# Patient Record
Sex: FEMALE | Race: WHITE | NOT HISPANIC OR LATINO | Employment: FULL TIME | URBAN - METROPOLITAN AREA
[De-identification: names, ages, dates, MRNs, and addresses within clinical notes are randomized per-mention and may not be internally consistent; named-entity substitution may affect disease eponyms.]

---

## 2023-05-10 ENCOUNTER — APPOINTMENT (OUTPATIENT)
Dept: PRIMARY CARE | Facility: CLINIC | Age: 33
End: 2023-05-10
Payer: COMMERCIAL

## 2023-05-12 LAB — URINE CULTURE: NO GROWTH

## 2023-05-18 ENCOUNTER — APPOINTMENT (OUTPATIENT)
Dept: PRIMARY CARE | Facility: CLINIC | Age: 33
End: 2023-05-18
Payer: COMMERCIAL

## 2023-06-13 ENCOUNTER — OFFICE VISIT (OUTPATIENT)
Dept: PRIMARY CARE | Facility: CLINIC | Age: 33
End: 2023-06-13
Payer: COMMERCIAL

## 2023-06-13 VITALS
HEART RATE: 66 BPM | DIASTOLIC BLOOD PRESSURE: 78 MMHG | SYSTOLIC BLOOD PRESSURE: 128 MMHG | TEMPERATURE: 97.6 F | OXYGEN SATURATION: 99 % | HEIGHT: 63 IN | BODY MASS INDEX: 25.78 KG/M2

## 2023-06-13 DIAGNOSIS — Z00.00 HEALTH MAINTENANCE EXAMINATION: Primary | ICD-10-CM

## 2023-06-13 DIAGNOSIS — Z80.0 FAMILY HISTORY OF COLON CANCER IN FATHER: ICD-10-CM

## 2023-06-13 LAB
ANION GAP IN SER/PLAS: 14 MMOL/L (ref 10–20)
CALCIUM (MG/DL) IN SER/PLAS: 9.4 MG/DL (ref 8.6–10.6)
CARBON DIOXIDE, TOTAL (MMOL/L) IN SER/PLAS: 25 MMOL/L (ref 21–32)
CHLORIDE (MMOL/L) IN SER/PLAS: 103 MMOL/L (ref 98–107)
CHOLESTEROL (MG/DL) IN SER/PLAS: 210 MG/DL (ref 0–199)
CHOLESTEROL IN HDL (MG/DL) IN SER/PLAS: 56.8 MG/DL
CHOLESTEROL/HDL RATIO: 3.7
CREATININE (MG/DL) IN SER/PLAS: 0.91 MG/DL (ref 0.5–1.05)
ERYTHROCYTE DISTRIBUTION WIDTH (RATIO) BY AUTOMATED COUNT: 12.5 % (ref 11.5–14.5)
ERYTHROCYTE MEAN CORPUSCULAR HEMOGLOBIN CONCENTRATION (G/DL) BY AUTOMATED: 32.7 G/DL (ref 32–36)
ERYTHROCYTE MEAN CORPUSCULAR VOLUME (FL) BY AUTOMATED COUNT: 96 FL (ref 80–100)
ERYTHROCYTES (10*6/UL) IN BLOOD BY AUTOMATED COUNT: 4.33 X10E12/L (ref 4–5.2)
GFR FEMALE: 87 ML/MIN/1.73M2
GLUCOSE (MG/DL) IN SER/PLAS: 76 MG/DL (ref 74–99)
HEMATOCRIT (%) IN BLOOD BY AUTOMATED COUNT: 41.6 % (ref 36–46)
HEMOGLOBIN (G/DL) IN BLOOD: 13.6 G/DL (ref 12–16)
LDL: 125 MG/DL (ref 0–99)
LEUKOCYTES (10*3/UL) IN BLOOD BY AUTOMATED COUNT: 8.6 X10E9/L (ref 4.4–11.3)
NRBC (PER 100 WBCS) BY AUTOMATED COUNT: 0 /100 WBC (ref 0–0)
PLATELETS (10*3/UL) IN BLOOD AUTOMATED COUNT: 230 X10E9/L (ref 150–450)
POTASSIUM (MMOL/L) IN SER/PLAS: 4.2 MMOL/L (ref 3.5–5.3)
SODIUM (MMOL/L) IN SER/PLAS: 138 MMOL/L (ref 136–145)
TRIGLYCERIDE (MG/DL) IN SER/PLAS: 141 MG/DL (ref 0–149)
UREA NITROGEN (MG/DL) IN SER/PLAS: 10 MG/DL (ref 6–23)
VLDL: 28 MG/DL (ref 0–40)

## 2023-06-13 PROCEDURE — 85027 COMPLETE CBC AUTOMATED: CPT

## 2023-06-13 PROCEDURE — 99385 PREV VISIT NEW AGE 18-39: CPT | Performed by: FAMILY MEDICINE

## 2023-06-13 PROCEDURE — 80061 LIPID PANEL: CPT

## 2023-06-13 PROCEDURE — 80048 BASIC METABOLIC PNL TOTAL CA: CPT

## 2023-06-13 PROCEDURE — 1036F TOBACCO NON-USER: CPT | Performed by: FAMILY MEDICINE

## 2023-06-13 RX ORDER — CLOBETASOL PROPIONATE 0.5 MG/G
OINTMENT TOPICAL
COMMUNITY
Start: 2022-08-11

## 2023-06-13 RX ORDER — LEVONORGESTREL AND ETHINYL ESTRADIOL 0.15-0.03
1 KIT ORAL DAILY
COMMUNITY

## 2023-06-13 RX ORDER — TESTOSTERONE 10 MG/G
GEL TOPICAL DAILY
COMMUNITY
End: 2023-06-13 | Stop reason: ENTERED-IN-ERROR

## 2023-06-13 ASSESSMENT — PATIENT HEALTH QUESTIONNAIRE - PHQ9
2. FEELING DOWN, DEPRESSED OR HOPELESS: NOT AT ALL
SUM OF ALL RESPONSES TO PHQ9 QUESTIONS 1 AND 2: 0
1. LITTLE INTEREST OR PLEASURE IN DOING THINGS: NOT AT ALL

## 2023-06-13 NOTE — ASSESSMENT & PLAN NOTE
Recommended screening guidelines addressed and orders placed as indicated by age and chronic conditions  Screening labs ordered, will call with results  GYN for pap/pelvic/breast  Continue to work on healthy lifestyle including well balanced diet, regular activity, limit alcohol, no tobacco products and safe sexual practices  Follow up annually

## 2023-06-13 NOTE — ASSESSMENT & PLAN NOTE
Pt unsure of exact age of father's diagnosis, possibly 40  Discussed that recommendation would be 10 years prior to dad's diagnosis  She will let me know and we will refer to GI if needed

## 2023-06-13 NOTE — PROGRESS NOTES
"Reason for Visit: Annual Physical Exam    HPI:  Healthy pt, no concerns today  Works as /leader, very active  Does have fam hx of colon cancer in father and paternal grandfather  Not sure of dad's exact age, possibly 40, will double check with mom    Active Problem List  Patient Active Problem List   Diagnosis    Health maintenance examination    Family history of colon cancer in father       Comprehensive Medical/Surgical/Social/Family History  Past Medical History:   Diagnosis Date    Encounter for immunization safety counseling 09/23/2020    HPV vaccine counseling     No past surgical history on file.  Social History     Tobacco Use    Smoking status: Never    Smokeless tobacco: Never   Substance Use Topics    Alcohol use: Yes     Comment: occ/soc    Drug use: Never     Family History   Problem Relation Name Age of Onset    Colon cancer Father  40    Other (anal cancer) Maternal Grandmother      Skin cancer Maternal Grandfather      Colon cancer Paternal Grandfather  65         Allergies and Medications  Patient has no known allergies.  Current Outpatient Medications on File Prior to Visit   Medication Sig Dispense Refill    clobetasol (Temovate) 0.05 % ointment apply to vulva after shower BID x 6 weeks followed by daily until can wean to 2-3 x/week without a flare.      levonorgestreL-ethinyl estrad (Seasonale) 0.15 mg-30 mcg (91) tablet Take 1 tablet by mouth once daily.      [DISCONTINUED] ESTRADIOL VAGL 2 g once daily. PART OF A COMPOUND DRUG 0.6MG      [DISCONTINUED] testosterone (Androgel) 12.5 mg/ 1.25 gram (1 %) gel in metered-dose pump Place on the skin once daily. 0.2% PART OF A COMPOUND DRUG       No current facility-administered medications on file prior to visit.         ROS otherwise negative aside from what was mentioned above in HPI.    Vitals  /78 (BP Location: Left arm, Patient Position: Sitting)   Pulse 66   Temp 36.4 °C (97.6 °F) (Temporal)   Ht 1.6 m (5' 2.99\")   " SpO2 99%   BMI 25.78 kg/m²   Body mass index is 25.78 kg/m².  Physical Exam  Gen: Alert, NAD  HEENT:  PERRL, EOMI, conjunctiva and sclera normal in appearance. External auditory canals/TMs normal; Oral cavity and posterior pharynx without lesions/exudate  Neck:  Supple with FROM; No masses/nodes palpable; Thyroid nontender and without nodules; No VIJAY  Respiratory:  Lungs CTAB  Cardiovascular:  Heart RRR. No M/R/G. Peripheral pulses equal bilaterally  Abdomen:  Soft, nontender, BS present throughout; No R/G/R; No HSM or masses palpated  Extremities:  FROM all extremities; Muscle strength grossly normal with good tone  Neuro:  CN II-XII intact; Reflexes 2+/2+; Gross motor and sensory intact  Skin:  No suspicious lesions present    Assessment and Plan:  Problem List Items Addressed This Visit       Health maintenance examination - Primary    Current Assessment & Plan     Recommended screening guidelines addressed and orders placed as indicated by age and chronic conditions  Screening labs ordered, will call with results  GYN for pap/pelvic/breast  Continue to work on healthy lifestyle including well balanced diet, regular activity, limit alcohol, no tobacco products and safe sexual practices  Follow up annually           Relevant Orders    CBC    Basic Metabolic Panel    Lipid Panel    Family history of colon cancer in father    Current Assessment & Plan     Pt unsure of exact age of father's diagnosis, possibly 40  Discussed that recommendation would be 10 years prior to dad's diagnosis  She will let me know and we will refer to GI if needed              Ketty Campbell MD

## 2023-12-01 ENCOUNTER — OFFICE VISIT (OUTPATIENT)
Dept: UROLOGY | Facility: CLINIC | Age: 33
End: 2023-12-01
Payer: COMMERCIAL

## 2023-12-01 VITALS
DIASTOLIC BLOOD PRESSURE: 77 MMHG | HEART RATE: 59 BPM | BODY MASS INDEX: 25.51 KG/M2 | TEMPERATURE: 97.5 F | WEIGHT: 144 LBS | SYSTOLIC BLOOD PRESSURE: 121 MMHG

## 2023-12-01 DIAGNOSIS — N90.89 LABIAL ADHESION, ACQUIRED: Primary | ICD-10-CM

## 2023-12-01 PROCEDURE — 1036F TOBACCO NON-USER: CPT | Performed by: OBSTETRICS & GYNECOLOGY

## 2023-12-01 PROCEDURE — 99214 OFFICE O/P EST MOD 30 MIN: CPT | Performed by: OBSTETRICS & GYNECOLOGY

## 2023-12-01 NOTE — PROGRESS NOTES
UROLOGIC FOLLOW-UP VISIT     PROBLEM LIST:  1. Clitoral adhesions     HISTORY OF PRESENT ILLNESS:   Candi Peres is a 32 y.o. female who was last seen June 2023 for her annual visit. She returns today to continue monitoring her clitoral adhesions.     She explains she has been well since her last visit. She is still using her dilator as well as uber lube. She also has been applying clobetasol nightly and testosterone every other day. She noticed prior to being on testosterone she was having pain with insertion but that has resolved with utilizing the cream.    She is still having good clitoral stimulation still. She noticed prior to being on combined estrogen+ testosterone she was having pain with insertion but that has resolved with utilizing the cream.    PAST MEDICAL HISTORY:  Past Medical History:   Diagnosis Date    Encounter for immunization safety counseling 09/23/2020    HPV vaccine counseling       PAST SURGICAL HISTORY:  No past surgical history on file.     ALLERGIES:   No Known Allergies     MEDICATIONS:   [unfilled]     SOCIAL HISTORY:  Patient  reports that she has never smoked. She has never used smokeless tobacco. She reports current alcohol use. She reports that she does not use drugs.   Social History     Socioeconomic History    Marital status: Single     Spouse name: Not on file    Number of children: Not on file    Years of education: Not on file    Highest education level: Not on file   Occupational History    Not on file   Tobacco Use    Smoking status: Never    Smokeless tobacco: Never   Substance and Sexual Activity    Alcohol use: Yes     Comment: occ/soc    Drug use: Never    Sexual activity: Not on file   Other Topics Concern    Not on file   Social History Narrative    Not on file     Social Determinants of Health     Financial Resource Strain: Not on file   Food Insecurity: Not on file   Transportation Needs: Not on file   Physical Activity: Not on file   Stress: Not on file   Social  Connections: Not on file   Intimate Partner Violence: Not on file   Housing Stability: Not on file       FAMILY HISTORY:  Family History   Problem Relation Name Age of Onset    Colon cancer Father  40    Other (anal cancer) Maternal Grandmother      Skin cancer Maternal Grandfather      Colon cancer Paternal Grandfather  65       REVIEW OF SYSTEMS:  Constitutional: Negative for fever and chills. Denies anorexia, weight loss.  Eyes: Negative for visual disturbance.   Respiratory: Negative for shortness of breath.    Cardiovascular: Negative for chest pain.   Gastrointestinal: Negative for nausea and vomiting.   Genitourinary: See interval history above.  Skin: Negative for rash.   Neurological: Negative for dizziness and numbness.   Psychiatric/Behavioral: Negative for confusion and decreased concentration.     PHYSICAL EXAM:  Blood pressure 121/77, pulse 59, temperature 36.4 °C (97.5 °F), weight 65.3 kg (144 lb).  Some hyperpigmented, dark brown area at 6 oclock on her perineum and left labia majora    Lab Results   Component Value Date    BUN 10 06/13/2023    CREATININE 0.91 06/13/2023     06/13/2023    K 4.2 06/13/2023     06/13/2023    CO2 25 06/13/2023    CALCIUM 9.4 06/13/2023      Lab Results   Component Value Date    WBC 8.6 06/13/2023    RBC 4.33 06/13/2023    HGB 13.6 06/13/2023    HCT 41.6 06/13/2023    MCV 96 06/13/2023    MCHC 32.7 06/13/2023    RDW 12.5 06/13/2023     06/13/2023           Assessment:    No diagnosis found.    Candi Peres is a 32 y.o. female with lichen sclerosus and clitoral phimosis.     She is doing well. She is still getting good sensation to her clitoris. I want her to continue with her clobetasol, but I would like her to get to a place where she is not needing to use it nightly. We will keep an eye on the hyperpigmented spots on her labia and perineum.      Plan:   Lichen sclerosus  - Continue clobetasol every other night  - Continue the estrogen+ testosterone  cream     Follow up in 6 months for annual wellness exam.    Scribe Attestation  By signing my name below, I, Alexandra Batista   attest that this documentation has been prepared under the direction and in the presence of Christie Sesay MD MPH.

## 2024-01-18 ENCOUNTER — OFFICE VISIT (OUTPATIENT)
Dept: UROLOGY | Facility: CLINIC | Age: 34
End: 2024-01-18
Payer: COMMERCIAL

## 2024-01-18 VITALS
BODY MASS INDEX: 25.69 KG/M2 | WEIGHT: 145 LBS | TEMPERATURE: 97.8 F | DIASTOLIC BLOOD PRESSURE: 78 MMHG | RESPIRATION RATE: 14 BRPM | HEIGHT: 63 IN | HEART RATE: 57 BPM | SYSTOLIC BLOOD PRESSURE: 129 MMHG

## 2024-01-18 DIAGNOSIS — L81.9 HYPERPIGMENTED SKIN LESION: ICD-10-CM

## 2024-01-18 DIAGNOSIS — N90.89 LABIAL ADHESION, ACQUIRED: ICD-10-CM

## 2024-01-18 PROCEDURE — 88305 TISSUE EXAM BY PATHOLOGIST: CPT | Performed by: DERMATOLOGY

## 2024-01-18 PROCEDURE — 88305 TISSUE EXAM BY PATHOLOGIST: CPT

## 2024-01-18 PROCEDURE — 88342 IMHCHEM/IMCYTCHM 1ST ANTB: CPT

## 2024-01-18 PROCEDURE — 1036F TOBACCO NON-USER: CPT | Performed by: OBSTETRICS & GYNECOLOGY

## 2024-01-18 PROCEDURE — 99024 POSTOP FOLLOW-UP VISIT: CPT | Performed by: OBSTETRICS & GYNECOLOGY

## 2024-01-18 PROCEDURE — 88342 IMHCHEM/IMCYTCHM 1ST ANTB: CPT | Performed by: DERMATOLOGY

## 2024-01-18 RX ORDER — ESTRADIOL 0.1 MG/G
CREAM VAGINAL
Qty: 42.5 G | Refills: 3 | Status: SHIPPED | OUTPATIENT
Start: 2024-01-18

## 2024-01-18 ASSESSMENT — PAIN SCALES - GENERAL: PAINLEVEL: 0-NO PAIN

## 2024-01-18 NOTE — PROGRESS NOTES
UROLOGIC FOLLOW-UP VISIT     PROBLEM LIST:  1. Clitoral adhesions       HISTORY OF PRESENT ILLNESS:   Candi Peres is a 33 y.o. female who was last seen on December 2023 for follow-up of hyperpigmentation around the vulva that has worsened since our last visit.    INTERVAL HISTORY:  She is currently being managed for clitoral adhesions.    PAST MEDICAL HISTORY:  Past Medical History:   Diagnosis Date    Encounter for immunization safety counseling 09/23/2020    HPV vaccine counseling       PAST SURGICAL HISTORY:  No past surgical history on file.     ALLERGIES:   No Known Allergies     MEDICATIONS:   [unfilled]     SOCIAL HISTORY:  Patient  reports that she has never smoked. She has never used smokeless tobacco. She reports current alcohol use. She reports that she does not use drugs.   Social History     Socioeconomic History    Marital status: Single     Spouse name: Not on file    Number of children: Not on file    Years of education: Not on file    Highest education level: Not on file   Occupational History    Not on file   Tobacco Use    Smoking status: Never    Smokeless tobacco: Never   Substance and Sexual Activity    Alcohol use: Yes     Comment: occ/soc    Drug use: Never    Sexual activity: Not on file   Other Topics Concern    Not on file   Social History Narrative    Not on file     Social Determinants of Health     Financial Resource Strain: Not on file   Food Insecurity: Not on file   Transportation Needs: Not on file   Physical Activity: Not on file   Stress: Not on file   Social Connections: Not on file   Intimate Partner Violence: Not on file   Housing Stability: Not on file       FAMILY HISTORY:  Family History   Problem Relation Name Age of Onset    Colon cancer Father  40    Other (anal cancer) Maternal Grandmother      Skin cancer Maternal Grandfather      Colon cancer Paternal Grandfather  65       REVIEW OF SYSTEMS:  Constitutional: Negative for fever and chills. Denies anorexia, weight  "loss.  Eyes: Negative for visual disturbance.   Respiratory: Negative for shortness of breath.    Cardiovascular: Negative for chest pain.   Gastrointestinal: Negative for nausea and vomiting.   Genitourinary: See interval history above.  Skin: Negative for rash.   Neurological: Negative for dizziness and numbness.   Psychiatric/Behavioral: Negative for confusion and decreased concentration.     PHYSICAL EXAM:  Blood pressure 129/78, pulse 57, temperature 36.6 °C (97.8 °F), resp. rate 14, height 1.6 m (5' 3\"), weight 65.8 kg (145 lb).  Constitutional: Patient appears well-developed and well-nourished. No distress.    Head: Normocephalic and atraumatic.    Neck: Normal range of motion.    Cardiovascular: Normal rate.    Pulmonary/Chest: Effort normal. No respiratory distress.   : Presence of hyperpigmentation with irregular edges along the posterior perineum as well as the left labium minus.  Musculoskeletal: Normal range of motion.    Neurological: Alert and oriented to person, place, and time.  Psychiatric: Normal mood and affect. Behavior is normal. Thought content normal.      Vulvar Biopsy:  Consent for the procedure was given and risks benefits and alternatives included bleeding, infection need for future procedure.  All questions addressed.      Examination of the vulva revealed presence of hyperpigmentation with irregular edges along the posterior perineum as well as the left labia majora. Decision for area for biopsy made to include the abnormal area as well as normal appearing skin.  The area was then prepped with Betadine 10 cc of 1% lidocaine was injected after aspirating.         Punch biopsy was then done and sent to pathology.  The area was closed with a single 3-0 Vicryl single suture.      Hemostasis was achieved.  The patient tolerated the procedure well and was given instructions on wound healing.       Pathology results should return in 2-3 weeks at which time we will have a follow-up " appointment.         Assessment:      Candi Peres is a 33 y.o. female with Clitoral adhesions.     Plan:    Performed a Vulvar biopsy. Await pathology results      Scribe Attestation  By signing my name below, ILaura, Scribe   attest that this documentation has been prepared under the direction and in the presence of Christie Sesay MD MPH.

## 2024-01-24 LAB
LAB AP ASR DISCLAIMER: NORMAL
LABORATORY COMMENT REPORT: NORMAL
PATH REPORT.FINAL DX SPEC: NORMAL
PATH REPORT.GROSS SPEC: NORMAL
PATH REPORT.RELEVANT HX SPEC: NORMAL
PATH REPORT.TOTAL CANCER: NORMAL

## 2024-01-25 ENCOUNTER — APPOINTMENT (OUTPATIENT)
Dept: UROLOGY | Facility: CLINIC | Age: 34
End: 2024-01-25
Payer: COMMERCIAL

## 2024-02-09 DIAGNOSIS — L81.9 HYPERPIGMENTED SKIN LESION: ICD-10-CM

## 2024-02-12 ENCOUNTER — TELEPHONE (OUTPATIENT)
Dept: DERMATOLOGY | Facility: CLINIC | Age: 34
End: 2024-02-12
Payer: COMMERCIAL

## 2024-02-12 NOTE — TELEPHONE ENCOUNTER
Pt is bieng referred to Dr Mcwilliams and the soonest appt she was given was 08/28/24 she is hoping to get a sooner appt if someone can reach out to pt and reschedule .Thanks You

## 2024-02-20 ENCOUNTER — OFFICE VISIT (OUTPATIENT)
Dept: DERMATOLOGY | Facility: CLINIC | Age: 34
End: 2024-02-20
Payer: COMMERCIAL

## 2024-02-20 DIAGNOSIS — L81.9 HYPERPIGMENTATION OF SKIN: Primary | ICD-10-CM

## 2024-02-20 PROCEDURE — 1036F TOBACCO NON-USER: CPT | Performed by: DERMATOLOGY

## 2024-02-20 PROCEDURE — 99203 OFFICE O/P NEW LOW 30 MIN: CPT | Performed by: DERMATOLOGY

## 2024-02-20 ASSESSMENT — DERMATOLOGY QUALITY OF LIFE (QOL) ASSESSMENT
RATE HOW BOTHERED YOU ARE BY SYMPTOMS OF YOUR SKIN PROBLEM (EG, ITCHING, STINGING BURNING, HURTING OR SKIN IRRITATION): 3
DATE THE QUALITY-OF-LIFE ASSESSMENT WAS COMPLETED: 66890
RATE HOW EMOTIONALLY BOTHERED YOU ARE BY YOUR SKIN PROBLEM (FOR EXAMPLE, WORRY, EMBARRASSMENT, FRUSTRATION): 0 - NEVER BOTHERED
WHAT SINGLE SKIN CONDITION LISTED BELOW IS THE PATIENT ANSWERING THE QUALITY-OF-LIFE ASSESSMENT QUESTIONS ABOUT: NONE OF THE ABOVE
ARE THERE EXCLUSIONS OR EXCEPTIONS FOR THE QUALITY OF LIFE ASSESSMENT: NO
DID YOU DOCUMENT A PATIENT REASON(S) FOR NOT USING THE QUALITY OF LIFE ASSESSMENT: NO
RATE HOW BOTHERED YOU ARE BY SYMPTOMS OF YOUR SKIN PROBLEM (EG, ITCHING, STINGING BURNING, HURTING OR SKIN IRRITATION): 3
DID THE PATIENT HAVE A SEVERE MENTAL AND/OR PHYSICAL INCAPACITY THAT PREVENTED HIM OR HER FROM COMPLETING THE ASSESSMENT: NO
RATE HOW BOTHERED YOU ARE BY EFFECTS OF YOUR SKIN PROBLEMS ON YOUR ACTIVITIES (EG, GOING OUT, ACCOMPLISHING WHAT YOU WANT, WORK ACTIVITIES OR YOUR RELATIONSHIPS WITH OTHERS): 0 - NEVER BOTHERED
RATE HOW BOTHERED YOU ARE BY EFFECTS OF YOUR SKIN PROBLEMS ON YOUR ACTIVITIES (EG, GOING OUT, ACCOMPLISHING WHAT YOU WANT, WORK ACTIVITIES OR YOUR RELATIONSHIPS WITH OTHERS): 0 - NEVER BOTHERED
RATE HOW EMOTIONALLY BOTHERED YOU ARE BY YOUR SKIN PROBLEM (FOR EXAMPLE, WORRY, EMBARRASSMENT, FRUSTRATION): 0 - NEVER BOTHERED
RATE HOW BOTHERED YOU ARE BY SYMPTOMS OF YOUR SKIN PROBLEM (EG, ITCHING, STINGING BURNING, HURTING OR SKIN IRRITATION): 0 - NEVER BOTHERED
RATE HOW BOTHERED YOU ARE BY EFFECTS OF YOUR SKIN PROBLEMS ON YOUR ACTIVITIES (EG, GOING OUT, ACCOMPLISHING WHAT YOU WANT, WORK ACTIVITIES OR YOUR RELATIONSHIPS WITH OTHERS): 0 - NEVER BOTHERED
WAS THE PATIENT DIAGNOSED WITH A SKIN CONDITION THAT IS INCLUDED IN THE DENOMINATOR DEFINITION (E.G.PSORIASIS, DERMATITIS) BUT IDENTIFIED A SKIN CONDITION THAT IS NOT (E.G. MELANOMA, NEVI) THE MAIN CONDITION ON THEIR ASSESSMENT: NO
WHAT SINGLE SKIN CONDITION LISTED BELOW IS THE PATIENT ANSWERING THE QUALITY-OF-LIFE ASSESSMENT QUESTIONS ABOUT: NONE OF THE ABOVE
RATE HOW EMOTIONALLY BOTHERED YOU ARE BY YOUR SKIN PROBLEM (FOR EXAMPLE, WORRY, EMBARRASSMENT, FRUSTRATION): 0 - NEVER BOTHERED

## 2024-02-20 ASSESSMENT — DERMATOLOGY PATIENT ASSESSMENT
DO YOU USE SUNSCREEN: OCCASIONALLY
ARE YOU TRYING TO GET PREGNANT: NO
DO YOU HAVE ANY NEW OR CHANGING LESIONS: NO
HAVE YOU HAD OR DO YOU HAVE VASCULAR DISEASE: NO
ARE YOU ON BIRTH CONTROL: YES
WHAT TYPE OF BIRTH CONTROL: PILL
DO YOU HAVE IRREGULAR MENSTRUAL CYCLES: NO
HAVE YOU HAD OR DO YOU HAVE A STAPH INFECTION: NO
ARE YOU AN ORGAN TRANSPLANT RECIPIENT: NO
DO YOU USE A TANNING BED: YES, PREVIOUSLY

## 2024-02-20 ASSESSMENT — PATIENT GLOBAL ASSESSMENT (PGA): PATIENT GLOBAL ASSESSMENT: PATIENT GLOBAL ASSESSMENT:  1 - CLEAR

## 2024-02-20 ASSESSMENT — ITCH NUMERIC RATING SCALE: HOW SEVERE IS YOUR ITCHING?: 0

## 2024-02-20 NOTE — Clinical Note
This is a very tough case - the way the pigment is scattered across the vulva would seem to be more consistent with a reactive melanocytosis. I took some good pictures and I'll see her back in May. If the pigment is not dramatically improving I will do sampling biopsies

## 2024-02-20 NOTE — PROGRESS NOTES
Subjective     Candi Peres is a 33 y.o. female who presents for the following: Pigment Change (Vaginal area).   Started August 2022, referred by ob/gyn to Dr. Sesay for clitoral and labial adhesions. Had surgery to release. Biopsy was done to eval for lichen sclerosis and it showed squamous mucosa with fibrosis; nondiagnostic, could be a tag. Started on clobetasol and testosterone topically. Repeat Vulvar biopsy 1/18/24 by Dr. Christie Sesay in urogyne demonstrated pigment incontinence with mild melanocyte hyperplasia and basal layer melanin pigmentation. This could be consistent with lentigo / melanotic macule but it was only partially representative of the lesion. Due to melanocyte hyperplasia, further biopsies or re-excision is recommended     Review of Systems:  No other skin or systemic complaints other than what is documented elsewhere in the note.    The following portions of the chart were reviewed this encounter and updated as appropriate:          Skin Cancer History  No skin cancer on file.      Specialty Problems    None       Objective   Well appearing patient in no apparent distress; mood and affect are within normal limits.    A focused skin examination was performed. All findings within normal limits unless otherwise noted below.    Assessment/Plan   1. Hyperpigmentation of skin (3)  Left Labium Minus, Perineum, Right Labium Minus  Irregular dark brown macules on both left and right labia majora and on posterior fourchette. On most of the pigmented area the dermoscopy pigmented pattern has even whorls. On the left side the pigment pattern is smudged and broken more but it might be where biopsies have been taken                  Hyperpigmentation due to hormonal change vs. Atypical melanocytic proliferation    - recommendation would be for more sampling biopsies either with myself or Dr. Sesay. If it is with Dr. Sesay I could help interpret the pathology results    - she does report that the pigment is  improving. Given this and the benign dermoscopic pattern in most of the areas we agreed to monitor a little longer    Related Procedures  Follow Up In Dermatology - Established Patient        Follow up 3 months

## 2024-05-09 ENCOUNTER — APPOINTMENT (OUTPATIENT)
Dept: DERMATOLOGY | Facility: CLINIC | Age: 34
End: 2024-05-09
Payer: COMMERCIAL

## 2024-05-21 ENCOUNTER — OFFICE VISIT (OUTPATIENT)
Dept: DERMATOLOGY | Facility: CLINIC | Age: 34
End: 2024-05-21
Payer: COMMERCIAL

## 2024-05-21 DIAGNOSIS — L74.519 PRIMARY FOCAL HYPERHIDROSIS: Primary | ICD-10-CM

## 2024-05-21 DIAGNOSIS — L81.9 HYPERPIGMENTATION OF SKIN: ICD-10-CM

## 2024-05-21 PROCEDURE — 99214 OFFICE O/P EST MOD 30 MIN: CPT | Performed by: DERMATOLOGY

## 2024-05-21 PROCEDURE — 1036F TOBACCO NON-USER: CPT | Performed by: DERMATOLOGY

## 2024-05-21 RX ORDER — ALUMINUM CHLORIDE 20 %
1 LOTION (ML) TOPICAL DAILY
Qty: 120 ML | Refills: 11 | Status: SHIPPED | OUTPATIENT
Start: 2024-05-21

## 2024-05-21 ASSESSMENT — DERMATOLOGY QUALITY OF LIFE (QOL) ASSESSMENT
RATE HOW EMOTIONALLY BOTHERED YOU ARE BY YOUR SKIN PROBLEM (FOR EXAMPLE, WORRY, EMBARRASSMENT, FRUSTRATION): 0 - NEVER BOTHERED
RATE HOW BOTHERED YOU ARE BY SYMPTOMS OF YOUR SKIN PROBLEM (EG, ITCHING, STINGING BURNING, HURTING OR SKIN IRRITATION): 0 - NEVER BOTHERED
ARE THERE EXCLUSIONS OR EXCEPTIONS FOR THE QUALITY OF LIFE ASSESSMENT: NO
RATE HOW BOTHERED YOU ARE BY EFFECTS OF YOUR SKIN PROBLEMS ON YOUR ACTIVITIES (EG, GOING OUT, ACCOMPLISHING WHAT YOU WANT, WORK ACTIVITIES OR YOUR RELATIONSHIPS WITH OTHERS): 0 - NEVER BOTHERED
WHAT SINGLE SKIN CONDITION LISTED BELOW IS THE PATIENT ANSWERING THE QUALITY-OF-LIFE ASSESSMENT QUESTIONS ABOUT: NONE OF THE ABOVE
DATE THE QUALITY-OF-LIFE ASSESSMENT WAS COMPLETED: 66981

## 2024-05-21 ASSESSMENT — PATIENT GLOBAL ASSESSMENT (PGA): PATIENT GLOBAL ASSESSMENT: PATIENT GLOBAL ASSESSMENT:  1 - CLEAR

## 2024-05-21 NOTE — PROGRESS NOTES
"Josie Peres is a 33 y.o. female who presents for the following: HYPERIGEMENTATION OF SKIN (Follow up on perineum area).     Review of Systems:  No other skin or systemic complaints other than what is documented elsewhere in the note.    The following portions of the chart were reviewed this encounter and updated as appropriate:   Tobacco  Allergies  Meds  Problems  Med Hx  Surg Hx         Skin Cancer History  No skin cancer on file.      Specialty Problems    None       Objective   Well appearing patient in no apparent distress; mood and affect are within normal limits.    A focused skin examination was performed. All findings within normal limits unless otherwise noted below.    Assessment/Plan   1. Primary focal hyperhidrosis (2)  Left Axilla, Right Axilla  -Normal appearing skin. Excessive sweating noted.        -Nature of condition discussed  - She works in [employer] industry, flares in summer  - prior treatment with MiraDry and Qbrexa pads (not covered, used coupon)      -Treatment options discussed  -Recommend the following. Discussed insurance coverage and she agrees to try    Related Medications  aluminum chloride (Bromi-Lotion) 20 % lotion  Apply 1 Application topically once daily. To dry skin in affected areas. Once excessive sweating has stopped, may decrease to once or twice weekly. Wash treated area in the morning    2. Hyperpigmentation of skin (3)  Left Labium Minus, Posterior Fourchette, Right Labium Minus  Hyperpigmented patches present but smaller and lighter as compared to prior photos. The patch on posterior fourchette is still dark but has a very even benign \"finger print\" pattern on dermoscopy. That one is stable.                       Started August 2022, referred by ob/gyn to Dr. Sesay for clitoral and labial adhesions. Had surgery to release. Biopsy was done to eval for lichen sclerosis and it showed squamous mucosa with fibrosis; nondiagnostic, could be a tag. Started on " clobetasol and testosterone topically. Repeat Vulvar biopsy 1/18/24 by Dr. Christie Sesay in urogyne demonstrated pigment incontinence with mild melanocyte hyperplasia and basal layer melanin pigmentation. This could be consistent with lentigo / melanotic macule but it was only partially representative of the lesion. Due to melanocyte hyperplasia, further biopsies or re-excision is recommended     Melanocyte hyperplasia from testosterone cream is favored, cont to monitor as it improves. She has follow up with Dr. Sesay next month    Related Procedures  Follow Up In Dermatology - Established Patient  Follow Up In Dermatology - Established Patient        Follow up 4 months hyperpigmentation of vulva

## 2024-05-22 ENCOUNTER — APPOINTMENT (OUTPATIENT)
Dept: DERMATOLOGY | Facility: CLINIC | Age: 34
End: 2024-05-22
Payer: COMMERCIAL

## 2024-05-24 ENCOUNTER — PATIENT MESSAGE (OUTPATIENT)
Dept: DERMATOLOGY | Facility: CLINIC | Age: 34
End: 2024-05-24
Payer: COMMERCIAL

## 2024-05-24 DIAGNOSIS — L74.519 PRIMARY FOCAL HYPERHIDROSIS: Primary | ICD-10-CM

## 2024-05-28 ENCOUNTER — TELEPHONE (OUTPATIENT)
Dept: DERMATOLOGY | Facility: CLINIC | Age: 34
End: 2024-05-28
Payer: COMMERCIAL

## 2024-06-03 ENCOUNTER — APPOINTMENT (OUTPATIENT)
Dept: DERMATOLOGY | Facility: HOSPITAL | Age: 34
End: 2024-06-03
Payer: COMMERCIAL

## 2024-07-22 NOTE — PROGRESS NOTES
FOLLOW-UP VISIT     PROBLEM LIST:  1. Labial adhesions  2. Annual exam       HISTORY OF PRESENT ILLNESS:   Candi Peres is a 33 y.o. female who was last seen on 1/18/24 for follow-up of hyperpigmentation around the vulva. She is currently seeing Dr. Mcwilliams for her hyperpigmentation concerns. She is no longer taking testosterone. She is currently taking clobetasol 0.05%. She is sexually active and states she has some pain with insertion. She is currently using a lubricant.     She presents today for her annual well woman exam.     INTERVAL HISTORY:  She is currently being managed for clitoral adhesions.     PAST MEDICAL HISTORY:  Past Medical History:   Diagnosis Date    Encounter for immunization safety counseling 09/23/2020    HPV vaccine counseling       PAST SURGICAL HISTORY:  No past surgical history on file.     ALLERGIES:   No Known Allergies        SOCIAL HISTORY:  Patient  reports that she has never smoked. She has never used smokeless tobacco. She reports current alcohol use. She reports that she does not use drugs.   Social History     Socioeconomic History    Marital status: Single     Spouse name: Not on file    Number of children: Not on file    Years of education: Not on file    Highest education level: Not on file   Occupational History    Not on file   Tobacco Use    Smoking status: Never    Smokeless tobacco: Never   Substance and Sexual Activity    Alcohol use: Yes     Comment: occ/soc    Drug use: Never    Sexual activity: Not on file   Other Topics Concern    Not on file   Social History Narrative    Not on file     Social Determinants of Health     Financial Resource Strain: Not on file   Food Insecurity: Not on file   Transportation Needs: Not on file   Physical Activity: Not on file   Stress: Not on file   Social Connections: Not on file   Intimate Partner Violence: Not on file   Housing Stability: Not on file       FAMILY HISTORY:  Family History   Problem Relation Name Age of Onset     "Colon cancer Father  40    Other (anal cancer) Maternal Grandmother      Skin cancer Maternal Grandfather      Colon cancer Paternal Grandfather  65       REVIEW OF SYSTEMS:  Constitutional: Negative for fever and chills. Denies anorexia, weight loss.  Eyes: Negative for visual disturbance.   Respiratory: Negative for shortness of breath.    Cardiovascular: Negative for chest pain.   Gastrointestinal: Negative for nausea and vomiting.   Genitourinary: See interval history above.  Skin: Negative for rash.   Neurological: Negative for dizziness and numbness.   Psychiatric/Behavioral: Negative for confusion and decreased concentration.     PHYSICAL EXAM:  Blood pressure 119/70, pulse 66, temperature 36.2 °C (97.1 °F), height 1.6 m (5' 3\"), weight 65.8 kg (145 lb).      The clitoral prepuce is adhered but not as much as previously.      Speculum exam done gently and cervix visualized, no friable tissue, cysts/lesions noted     Stable hyperpigmentation. There is mild hyperpigmentation at 6 o'clock.   See image     Assessment:      1. Labial adhesion, acquired            Candi Peres is a 33 y.o. female with labial adhesions s/p LS.  Hyperpigmentation improved/stable.     Plan:   Referral to pelvic floor physical therapy for entry dyspareunia.  Continue vaginal estrogen and clobetasol.   Follow-up with Dr. Mcwilliams for co-management  Send swab to test for yeast.     Christie Sesay MD MPH     Scribe Attestation  By signing my name below, [email or web address], Scribe   attest that this documentation has been prepared under the direction and in the presence of Christie Sesay MD MPH.  "

## 2024-07-25 ENCOUNTER — APPOINTMENT (OUTPATIENT)
Dept: UROLOGY | Facility: CLINIC | Age: 34
End: 2024-07-25
Payer: COMMERCIAL

## 2024-07-25 VITALS
WEIGHT: 145 LBS | DIASTOLIC BLOOD PRESSURE: 70 MMHG | SYSTOLIC BLOOD PRESSURE: 119 MMHG | HEART RATE: 66 BPM | TEMPERATURE: 97.1 F | BODY MASS INDEX: 25.69 KG/M2 | HEIGHT: 63 IN

## 2024-07-25 DIAGNOSIS — N94.10 DYSPAREUNIA, FEMALE: ICD-10-CM

## 2024-07-25 DIAGNOSIS — B37.31 YEAST INFECTION INVOLVING THE VAGINA AND SURROUNDING AREA: ICD-10-CM

## 2024-07-25 DIAGNOSIS — N90.89 LABIAL ADHESION, ACQUIRED: ICD-10-CM

## 2024-07-25 DIAGNOSIS — Z00.00 ENCOUNTER FOR GENERAL ADULT MEDICAL EXAMINATION WITHOUT ABNORMAL FINDINGS: ICD-10-CM

## 2024-07-25 DIAGNOSIS — Z30.9 ENCOUNTER FOR CONTRACEPTIVE MANAGEMENT, UNSPECIFIED TYPE: ICD-10-CM

## 2024-07-25 PROCEDURE — 99395 PREV VISIT EST AGE 18-39: CPT | Performed by: OBSTETRICS & GYNECOLOGY

## 2024-07-25 PROCEDURE — 87102 FUNGUS ISOLATION CULTURE: CPT

## 2024-07-25 PROCEDURE — 1036F TOBACCO NON-USER: CPT | Performed by: OBSTETRICS & GYNECOLOGY

## 2024-07-25 PROCEDURE — 3008F BODY MASS INDEX DOCD: CPT | Performed by: OBSTETRICS & GYNECOLOGY

## 2024-07-25 RX ORDER — LEVONORGESTREL / ETHINYL ESTRADIOL AND ETHINYL ESTRADIOL 150-30(84)
1 KIT ORAL DAILY
Qty: 91 TABLET | Refills: 3 | Status: SHIPPED | OUTPATIENT
Start: 2024-07-25

## 2024-07-25 ASSESSMENT — PAIN SCALES - GENERAL: PAINLEVEL: 0-NO PAIN

## 2024-07-27 LAB — YEAST SPEC QL CULT: ABNORMAL

## 2024-07-30 DIAGNOSIS — B37.31 YEAST INFECTION INVOLVING THE VAGINA AND SURROUNDING AREA: ICD-10-CM

## 2024-07-30 RX ORDER — FLUCONAZOLE 150 MG/1
TABLET ORAL
Qty: 2 TABLET | Refills: 0 | Status: SHIPPED | OUTPATIENT
Start: 2024-07-30

## 2024-08-28 ENCOUNTER — APPOINTMENT (OUTPATIENT)
Dept: DERMATOLOGY | Facility: CLINIC | Age: 34
End: 2024-08-28
Payer: COMMERCIAL

## 2024-09-09 DIAGNOSIS — Q52.5 FUSION OF LABIA: ICD-10-CM

## 2024-09-10 ENCOUNTER — APPOINTMENT (OUTPATIENT)
Dept: DERMATOLOGY | Facility: CLINIC | Age: 34
End: 2024-09-10
Payer: COMMERCIAL

## 2024-09-10 RX ORDER — CLOBETASOL PROPIONATE 0.5 MG/G
OINTMENT TOPICAL
Qty: 60 G | Refills: 3 | Status: SHIPPED | OUTPATIENT
Start: 2024-09-10

## 2024-10-08 ENCOUNTER — APPOINTMENT (OUTPATIENT)
Dept: DERMATOLOGY | Facility: CLINIC | Age: 34
End: 2024-10-08
Payer: COMMERCIAL

## 2024-10-08 DIAGNOSIS — L81.9 HYPERPIGMENTATION OF SKIN: ICD-10-CM

## 2024-10-08 PROCEDURE — 99213 OFFICE O/P EST LOW 20 MIN: CPT | Performed by: DERMATOLOGY

## 2024-10-08 PROCEDURE — 1036F TOBACCO NON-USER: CPT | Performed by: DERMATOLOGY

## 2024-10-08 ASSESSMENT — DERMATOLOGY PATIENT ASSESSMENT
DO YOU USE SUNSCREEN: DAILY
DO YOU HAVE IRREGULAR MENSTRUAL CYCLES: NO
ARE YOU TRYING TO GET PREGNANT: NO
ARE YOU ON BIRTH CONTROL: YES
DO YOU USE A TANNING BED: NO
HAVE YOU HAD OR DO YOU HAVE VASCULAR DISEASE: NO
HAVE YOU HAD OR DO YOU HAVE A STAPH INFECTION: NO
WHAT TYPE OF BIRTH CONTROL: PILL
ARE YOU AN ORGAN TRANSPLANT RECIPIENT: NO
DO YOU HAVE ANY NEW OR CHANGING LESIONS: NO

## 2024-10-08 ASSESSMENT — DERMATOLOGY QUALITY OF LIFE (QOL) ASSESSMENT
RATE HOW BOTHERED YOU ARE BY EFFECTS OF YOUR SKIN PROBLEMS ON YOUR ACTIVITIES (EG, GOING OUT, ACCOMPLISHING WHAT YOU WANT, WORK ACTIVITIES OR YOUR RELATIONSHIPS WITH OTHERS): 0 - NEVER BOTHERED
RATE HOW BOTHERED YOU ARE BY SYMPTOMS OF YOUR SKIN PROBLEM (EG, ITCHING, STINGING BURNING, HURTING OR SKIN IRRITATION): 0 - NEVER BOTHERED
ARE THERE EXCLUSIONS OR EXCEPTIONS FOR THE QUALITY OF LIFE ASSESSMENT: NO
RATE HOW EMOTIONALLY BOTHERED YOU ARE BY YOUR SKIN PROBLEM (FOR EXAMPLE, WORRY, EMBARRASSMENT, FRUSTRATION): 0 - NEVER BOTHERED
WAS THE PATIENT DIAGNOSED WITH A SKIN CONDITION THAT IS INCLUDED IN THE DENOMINATOR DEFINITION (E.G.PSORIASIS, DERMATITIS) BUT IDENTIFIED A SKIN CONDITION THAT IS NOT (E.G. MELANOMA, NEVI) THE MAIN CONDITION ON THEIR ASSESSMENT: NO
DATE THE QUALITY-OF-LIFE ASSESSMENT WAS COMPLETED: 67121
DID THE PATIENT HAVE A SEVERE MENTAL AND/OR PHYSICAL INCAPACITY THAT PREVENTED HIM OR HER FROM COMPLETING THE ASSESSMENT: NO

## 2024-10-08 ASSESSMENT — PATIENT GLOBAL ASSESSMENT (PGA): PATIENT GLOBAL ASSESSMENT: PATIENT GLOBAL ASSESSMENT:  1 - CLEAR

## 2024-10-08 ASSESSMENT — ITCH NUMERIC RATING SCALE: HOW SEVERE IS YOUR ITCHING?: 0

## 2024-10-08 NOTE — PROGRESS NOTES
"Josie Peres is a 33 y.o. female who presents for the following: Pigment Change (Vaginal area). Last derm visit 5/21/24 for hyperpigmentation for vulva thought to be related to topical testosterone. Monitoring with improvement.     She saw Dr. Sesay in July. Since she stopped the compounded drug with testosterone, intercourse has been more painful again. Started pelvic therapy in Sept, she has completed 6 weeks with stretches and dilation. New lubricant with CBD oil     Review of Systems:  No other skin or systemic complaints other than what is documented elsewhere in the note.    The following portions of the chart were reviewed this encounter and updated as appropriate:   Tobacco  Allergies  Meds  Problems  Med Hx  Surg Hx         Skin Cancer History  No skin cancer on file.      Specialty Problems    None       Objective   Well appearing patient in no apparent distress; mood and affect are within normal limits.    A focused skin examination was performed. All findings within normal limits unless otherwise noted below.    Assessment/Plan   1. Hyperpigmentation of skin (3)  Left Labium Minus, Posterior Fourchette, Right Labium Minus  Hyperpigmented patches present, stable as compared to photos from 5/21/24. The patch on posterior fourchette is still dark but has a very even benign \"finger print\" pattern on dermoscopy. They are all stable. Dr. 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 Sesay for clitoral and labial adhesions. Had surgery to release. Biopsy was done to eval for lichen sclerosis and it showed squamous mucosa with fibrosis; nondiagnostic, could be a tag. Started on clobetasol and testosterone topically. Repeat Vulvar biopsy 1/18/24 by Dr." Christie Sesay in urogyne demonstrated pigment incontinence with mild melanocyte hyperplasia and basal layer melanin pigmentation. This could be consistent with lentigo / melanotic macule but it was only partially representative of the lesion. Due to melanocyte hyperplasia, further biopsies or re-excision is recommended. Instead we have been monitoring closely and carefully with photography    Continue treatment with pelvic therapy    Avoid any further testosterone based therapy at this time. She did think this helped her symptoms but we discussed that any worsening in pigment would require repeat biopsies and potentially an excision    Related Procedures  Follow Up In Dermatology - Established Patient  Follow Up In Dermatology - Established Patient        Follow up 4-6 months for pigmentation

## 2024-12-18 NOTE — PROGRESS NOTES
FOLLOW-UP VISIT       HISTORY OF PRESENT ILLNESS:   Candi Peres is a 34 y.o. female who presents to me today for follow-up of hyperpigmentation of vulva.     Patient last saw Dr. Mcwilliams on 10/8/24 where her hyperpigmented patches remained stable from her last visit. Patient started PFPT in September with stretches and dilation which was going well for her. The patient discontinued use of vaginal estrogen in October due to not seeing benefit. Patient endorses dyspareunia without pain at insertion since the middle of November after menses. She then saw PFPT who told her maybe this was due to discontinuing vaginal estrogen. Patient is currently on Seasonique and has her menses every 3 months. Patient has not tried Intrarosa.     Dry needling with PT has helped a lot but she feels like she has backtracked with the week of menses that ended up causing so much discomfort/pain.    PAST MEDICAL HISTORY:  Past Medical History:   Diagnosis Date    Encounter for immunization safety counseling 09/23/2020    HPV vaccine counseling       PAST SURGICAL HISTORY:  No past surgical history on file.     ALLERGIES:   No Known Allergies     MEDICATIONS:   Medication Documentation Review Audit       Reviewed by Sofia Dewitt MA (Medical Assistant) on 12/19/24 at 1435      Medication Order Taking? Sig Documenting Provider Last Dose Status   clobetasol (Temovate) 0.05 % ointment [id number] Yes APPLY TO VULVA AFTER SHOWER TWICE A DAY X 6 WEEKS FOLLOWED BY DAILY UNTIL CAN WEAN TO 2-3 X/WEEK WITHOUT A FLARE. Christie Sesay MD [facility]  Active   estradiol (Estrace) 0.01 % (0.1 mg/gram) vaginal cream [id number] Yes Insert a pea-sized amount into vagina three times per week at bedtime Christie Sesay MD [facility] Taking Active   fluconazole (Diflucan) 150 mg tablet [id number] Yes Take one tablet every 72 hours Christie Sesay MD [facility]  Active   glycopyrronium tosylate 2.4 % towelette [id number] Yes Apply 1 Application topically once daily. Kerrie Mcwilliams MD  Taking Active   L norgest/e.estradioL-e.estrad (Seasonique) 0.15 mg-30 mcg (84)/10 mcg (7) tablets,dose pack,3 month tablet [id number] Yes TAKE 1 TABLET BY MOUTH EVERY DAY Christie Sesay MD [facility]  Active   levonorgestreL-ethinyl estrad (Seasonale) 0.15 mg-30 mcg (91) tablet [id number] Yes Take 1 tablet by mouth once daily. Historical Provider, MD Taking Active                     SOCIAL HISTORY:  Patient  reports that she has never smoked. She has never used smokeless tobacco. She reports current alcohol use. She reports that she does not use drugs.   Social History     Socioeconomic History    Marital status: Single     Spouse name: Not on file    Number of children: Not on file    Years of education: Not on file    Highest education level: Not on file   Occupational History    Not on file   Tobacco Use    Smoking status: Never    Smokeless tobacco: Never   Substance and Sexual Activity    Alcohol use: Yes     Comment: occ/soc    Drug use: Never    Sexual activity: Not on file   Other Topics Concern    Not on file   Social History Narrative    Not on file     Social Drivers of Health     Financial Resource Strain: Not on file   Food Insecurity: Not on file   Transportation Needs: Not on file   Physical Activity: Not on file   Stress: Not on file   Social Connections: Not on file   Intimate Partner Violence: Not on file   Housing Stability: Not on file       FAMILY HISTORY:  Family History   Problem Relation Name Age of Onset    Colon cancer Father  40    Other (anal cancer) Maternal Grandmother      Skin cancer Maternal Grandfather      Colon cancer Paternal Grandfather  65       REVIEW OF SYSTEMS:  Constitutional: Negative for fever and chills. Denies anorexia, weight loss.  Eyes: Negative for visual disturbance.   Respiratory: Negative for shortness of breath.    Cardiovascular: Negative for chest pain.   Gastrointestinal: Negative for nausea and vomiting.   Genitourinary: See interval history above.  Skin: Negative for  "rash.   Neurological: Negative for dizziness and numbness.   Psychiatric/Behavioral: Negative for confusion and decreased concentration.     PHYSICAL EXAM:  Blood pressure 129/80, pulse 76, temperature 36.2 °C (97.2 °F), height 1.6 m (5' 3\"), weight 66.2 kg (146 lb).  Constitutional: Patient appears well-developed and well-nourished. No distress.    Head: Normocephalic and atraumatic.    Neck: Normal range of motion.    Cardiovascular: Normal rate.    Pulmonary/Chest: Effort normal. No respiratory distress.   Musculoskeletal: Normal range of motion.    Neurological: Alert and oriented to person, place, and time.  Psychiatric: Normal mood and affect. Behavior is normal. Thought content normal.       Assessment:      1. Dyspareunia, female            Candi Peres is a 34 y.o. female with dyspareunia and painful menses.      Plan:   Prescription for Intrarosa suppositories sent to the patient's pharmacy.    Prescription for vaginal valium/baclofen suppositories sent to the compounding pharmacy to use for flare. If she needs it chronically, we will sign CSA.  Provided samples of Intrarosa.   Patient will start skipping the sugar pills with her Seasonique and only have menses every 6 months. Will take Intrarosa as needed during this time.   Follow-up as scheduled on 7/31/25.       Christie Sesay MD MPH       Scribe Attestation  By signing my name below, IMelodie, Scribe   attest that this documentation has been prepared under the direction and in the presence of Christie Sesay MD MPH.   "

## 2024-12-19 ENCOUNTER — APPOINTMENT (OUTPATIENT)
Dept: UROLOGY | Facility: CLINIC | Age: 34
End: 2024-12-19
Payer: COMMERCIAL

## 2024-12-19 VITALS
SYSTOLIC BLOOD PRESSURE: 129 MMHG | HEIGHT: 63 IN | BODY MASS INDEX: 25.87 KG/M2 | HEART RATE: 76 BPM | WEIGHT: 146 LBS | TEMPERATURE: 97.2 F | DIASTOLIC BLOOD PRESSURE: 80 MMHG

## 2024-12-19 DIAGNOSIS — N94.10 DYSPAREUNIA, FEMALE: ICD-10-CM

## 2024-12-19 PROCEDURE — 1036F TOBACCO NON-USER: CPT | Performed by: OBSTETRICS & GYNECOLOGY

## 2024-12-19 PROCEDURE — 3008F BODY MASS INDEX DOCD: CPT | Performed by: OBSTETRICS & GYNECOLOGY

## 2024-12-19 PROCEDURE — 99213 OFFICE O/P EST LOW 20 MIN: CPT | Performed by: OBSTETRICS & GYNECOLOGY

## 2024-12-19 RX ORDER — PRASTERONE 6.5 MG/1
6.5 INSERT VAGINAL NIGHTLY
Qty: 30 EACH | Refills: 5 | Status: SHIPPED | OUTPATIENT
Start: 2024-12-19

## 2024-12-19 ASSESSMENT — PAIN SCALES - GENERAL: PAINLEVEL_OUTOF10: 0-NO PAIN

## 2025-01-06 ENCOUNTER — LAB (OUTPATIENT)
Dept: LAB | Facility: LAB | Age: 35
End: 2025-01-06
Payer: COMMERCIAL

## 2025-01-06 DIAGNOSIS — R39.9 URINARY SYMPTOM OR SIGN: ICD-10-CM

## 2025-01-06 DIAGNOSIS — R39.9 URINARY SYMPTOM OR SIGN: Primary | ICD-10-CM

## 2025-01-06 PROCEDURE — 87186 SC STD MICRODIL/AGAR DIL: CPT

## 2025-01-06 PROCEDURE — 87086 URINE CULTURE/COLONY COUNT: CPT

## 2025-01-06 RX ORDER — NITROFURANTOIN 25; 75 MG/1; MG/1
100 CAPSULE ORAL 2 TIMES DAILY
Qty: 14 CAPSULE | Refills: 0 | Status: SHIPPED | OUTPATIENT
Start: 2025-01-06 | End: 2025-01-13

## 2025-01-06 RX ORDER — NITROFURANTOIN 25; 75 MG/1; MG/1
100 CAPSULE ORAL 2 TIMES DAILY
Qty: 14 CAPSULE | Refills: 0 | Status: CANCELLED | OUTPATIENT
Start: 2025-01-06 | End: 2025-01-13

## 2025-01-08 LAB — BACTERIA UR CULT: ABNORMAL

## 2025-02-10 ENCOUNTER — OFFICE VISIT (OUTPATIENT)
Dept: DERMATOLOGY | Facility: HOSPITAL | Age: 35
End: 2025-02-10
Payer: COMMERCIAL

## 2025-02-10 DIAGNOSIS — L81.9 HYPERPIGMENTATION OF SKIN: ICD-10-CM

## 2025-02-10 PROCEDURE — 99212 OFFICE O/P EST SF 10 MIN: CPT | Performed by: DERMATOLOGY

## 2025-02-10 PROCEDURE — 1036F TOBACCO NON-USER: CPT | Performed by: DERMATOLOGY

## 2025-02-10 ASSESSMENT — ITCH NUMERIC RATING SCALE: HOW SEVERE IS YOUR ITCHING?: 0

## 2025-02-10 ASSESSMENT — DERMATOLOGY PATIENT ASSESSMENT
DO YOU HAVE IRREGULAR MENSTRUAL CYCLES: NO
ARE YOU ON BIRTH CONTROL: YES
ARE YOU TRYING TO GET PREGNANT: NO
DO YOU HAVE ANY NEW OR CHANGING LESIONS: NO
DO YOU USE SUNSCREEN: OCCASIONALLY
DO YOU USE A TANNING BED: NO
HAVE YOU HAD OR DO YOU HAVE A STAPH INFECTION: NO
HAVE YOU HAD OR DO YOU HAVE VASCULAR DISEASE: NO
ARE YOU AN ORGAN TRANSPLANT RECIPIENT: NO

## 2025-02-10 ASSESSMENT — PATIENT GLOBAL ASSESSMENT (PGA): PATIENT GLOBAL ASSESSMENT: PATIENT GLOBAL ASSESSMENT:  1 - CLEAR

## 2025-02-10 ASSESSMENT — DERMATOLOGY QUALITY OF LIFE (QOL) ASSESSMENT
DATE THE QUALITY-OF-LIFE ASSESSMENT WAS COMPLETED: 67246
RATE HOW BOTHERED YOU ARE BY SYMPTOMS OF YOUR SKIN PROBLEM (EG, ITCHING, STINGING BURNING, HURTING OR SKIN IRRITATION): 0 - NEVER BOTHERED
DID THE PATIENT HAVE A SEVERE MENTAL AND/OR PHYSICAL INCAPACITY THAT PREVENTED HIM OR HER FROM COMPLETING THE ASSESSMENT: NO
WAS THE PATIENT DIAGNOSED WITH A SKIN CONDITION THAT IS INCLUDED IN THE DENOMINATOR DEFINITION (E.G.PSORIASIS, DERMATITIS) BUT IDENTIFIED A SKIN CONDITION THAT IS NOT (E.G. MELANOMA, NEVI) THE MAIN CONDITION ON THEIR ASSESSMENT: NO
RATE HOW EMOTIONALLY BOTHERED YOU ARE BY YOUR SKIN PROBLEM (FOR EXAMPLE, WORRY, EMBARRASSMENT, FRUSTRATION): 0 - NEVER BOTHERED
DID YOU DOCUMENT A PATIENT REASON(S) FOR NOT USING THE QUALITY OF LIFE ASSESSMENT: NO
ARE THERE EXCLUSIONS OR EXCEPTIONS FOR THE QUALITY OF LIFE ASSESSMENT: NO
RATE HOW BOTHERED YOU ARE BY EFFECTS OF YOUR SKIN PROBLEMS ON YOUR ACTIVITIES (EG, GOING OUT, ACCOMPLISHING WHAT YOU WANT, WORK ACTIVITIES OR YOUR RELATIONSHIPS WITH OTHERS): 0 - NEVER BOTHERED

## 2025-02-10 NOTE — PROGRESS NOTES
Subjective     Candi Peres is a 34 y.o. female who presents for the following: Pigment Change (Vaginal area). Last derm visit 10/8/24 for hyperpigmentation of vulva    Since last visit her menstrual cycle restarted and has undone her progress from pelvic floor therapy. She was started on baclofen/diazepam suppository and Intrarosa suppositories for symptomatic relief. She is now skipping the sugar pills on her Seasonique to avoid menstrual cycles    Review of Systems:  No other skin or systemic complaints other than what is documented elsewhere in the note.    The following portions of the chart were reviewed this encounter and updated as appropriate:   Tobacco  Allergies  Meds  Problems  Med Hx  Surg Hx         Skin Cancer History  No skin cancer on file.      Specialty Problems    None       Objective   Well appearing patient in no apparent distress; mood and affect are within normal limits.    A focused skin examination was performed. All findings within normal limits unless otherwise noted below.    Assessment/Plan   1. Hyperpigmentation of skin (3)  Left Labium Minus, Posterior Fourchette, Right Labium Minus  Hyperpigmented patches present, fading as compared to photos from 5/21/24. The pigment pattern has faded and is no longer crisp. It has faded uniformly. Of note, Dr. 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 Sesay for clitoral and labial adhesions. Had surgery to release. Biopsy was done to eval for lichen sclerosis and it showed squamous mucosa with fibrosis; nondiagnostic, could be a tag. Started on clobetasol and testosterone topically. Repeat Vulvar biopsy 1/18/24 by Dr. Christie Sesay in urogyne demonstrated pigment incontinence with mild  melanocyte hyperplasia and basal layer melanin pigmentation. This could be consistent with lentigo / melanotic macule but it was only partially representative of the lesion. Due to melanocyte hyperplasia, further biopsies or re-excision was recommended. Instead we have been monitoring closely and carefully with photography    Today:  - fading of hyperpigmentation after 1 year  - continue treatments with baclofen/diapzepam, intrarosa, and Seasonique with Dr. Sesay. She has also restarted estradiol cream withOUT re-hyperpigmentation  - would avoid testosterone cream since that was likely the cause    -can space out visits further. She sees Dr. Sesay in July (6 months) and can see me 2 months after that     Related Procedures  Follow Up In Dermatology - Established Patient  Follow Up In Dermatology - Established Patient        Follow up 8/2025 for hyperpigmentation of vulva

## 2025-02-25 ENCOUNTER — APPOINTMENT (OUTPATIENT)
Dept: ORTHOPEDIC SURGERY | Facility: CLINIC | Age: 35
End: 2025-02-25
Payer: COMMERCIAL

## 2025-02-25 ENCOUNTER — HOSPITAL ENCOUNTER (OUTPATIENT)
Dept: RADIOLOGY | Facility: CLINIC | Age: 35
Discharge: HOME | End: 2025-02-25
Payer: COMMERCIAL

## 2025-02-25 DIAGNOSIS — M54.16 LUMBAR RADICULITIS: ICD-10-CM

## 2025-02-25 DIAGNOSIS — M62.830 BACK MUSCLE SPASM: ICD-10-CM

## 2025-02-25 DIAGNOSIS — M47.816 LUMBAR SPONDYLOSIS: Primary | ICD-10-CM

## 2025-02-25 DIAGNOSIS — M47.816 LUMBAR SPONDYLOSIS: ICD-10-CM

## 2025-02-25 PROCEDURE — 1036F TOBACCO NON-USER: CPT | Performed by: PHYSICAL MEDICINE & REHABILITATION

## 2025-02-25 PROCEDURE — 72120 X-RAY BEND ONLY L-S SPINE: CPT

## 2025-02-25 PROCEDURE — 99204 OFFICE O/P NEW MOD 45 MIN: CPT | Performed by: PHYSICAL MEDICINE & REHABILITATION

## 2025-02-25 PROCEDURE — 72110 X-RAY EXAM L-2 SPINE 4/>VWS: CPT | Performed by: RADIOLOGY

## 2025-02-25 RX ORDER — METHOCARBAMOL 500 MG/1
TABLET, FILM COATED ORAL
Qty: 60 TABLET | Refills: 1 | Status: SHIPPED | OUTPATIENT
Start: 2025-02-25

## 2025-02-25 SDOH — SOCIAL STABILITY: SOCIAL NETWORK: SOCIAL ACTIVITY:: 6

## 2025-02-25 NOTE — PROGRESS NOTES
New Consult/New Patient Note    2/25/2025   No ref. provider found    Assessment: Very pleasant 34-year-old female with subacute left lower back and gluteal pain.  Intermittent rating symptoms to the left lateral thigh.  Pain can be severe and impacts her daily activity.  -Suspect left lumbar discogenic pain with intermittent radiculitis  -Component of myofascial pain    PLAN:  1)  Imaging/Diagnostic Studies: We will obtain updated lumbar x-ray to further assess.  MRI also ordered as she has had persistent pain symptoms despite well over 6 weeks of conservative treatment including physical therapy as well as.  Prior notes were also reviewed from her physical therapist office regarding dry needling and needles being retained  2)  Therapy/Rehabilitation: Has completed over 6 weeks including pelvic floor therapy as well as therapy dedicated to her lumbar spine.  This was done at a private office.  She has been working with them at least since December 2024 and continues  3)  Pharmacological Management: New Rx provided for Robaxin as needed-advised potential sedate of side effects.  No significant relief with NSAIDs  4)  Spine/Surgical Interventions: None at this time  5)  Alternative Treatments: May consider alternative treatment options in the future including manipulation (chiropractor versus osteopathic) and/or acupuncture if patient does not obtain optimal relief with initial treatment plan.  6)  Consultations:  None at this time  7)  Follow -up: 4-6 weeks or PRN if symptoms worsen/do not improve.   8)  Future treatment considerations: Pending updated MRI.  May benefit from lumbar CRISTIAN.    Patient advised of the difference between hurt and harm and advised to continue with all normal activities and exercises. Patient verbalized understanding of the above plan and was happy with the care provided.      The above clinical summary has been dictated with voice recognition software. It has not been proofread for  grammatical errors, typographical mistakes, or other semantic inconsistencies.    Thank you for visiting our office today. It was our pleasure to take part in your healthcare.     Do not hesitate to call with any questions regarding your plan of care after leaving at [phone]    To clinicians, thank you very much for this kind referral. It is a privilege to partner with you in the care of your patients. My office would be delighted to assist you with any further consultations or with questions regarding the plan of care outlined. Do not hesitate to call the office or contact me directly.     Sincerely,    MAKENNA Jones MD  , Physical Medicine and Rehabilitation, Orthopedic Spine  [facility] [employer]  [facility]         Candi Peres   is a 34 y.o. female who presents with left lower back pain  Location:  left lower back, at the waist or lower.  Ischial area   Radiation:  left lateral thigh.  No worsening numbness or weakness  Quality: dull ache    current 6/10,  at its worst  6/10  Exacerbated by dry needling. Of note, had needles left in place after her sessions of Dec. 3rd.  standing  Relieved by  yoga, keeping good posture   Onset, traumatic event:  feels pain started after dry needling   Has tried:  hot yoga    Valsalva sign is neg  Grocery cart sign is neg  Smoker:  no  Does not wake them at night  Litigation: no    Patient denies bowel/bladder incontinence, denies fever, denies unintentional weight loss, denies clumsiness of hands, feet, or dropping things.  Denies any constitutional or myelopathic symptomatology.      PREVIOUS TREATMENTS  IN THE LAST SIX MONTHS     Active conservative therapy  in the last six months (see below)              1. Physical therapy:  yes - pelvic floor and for lower back                                                                       2. Home exercise program after PT:  yes                                                    3. A physician supervised home exercise program (HEP):   yes              4. Chiropractic Care:  no                                                                  Passive conservative therapy  in the last six months (see below)              1. NSAIDS:  yes                                                                                                         2. Prescription pain medication:                                                              3. Acupuncture:                                                                                             4. Tens unit:      Assistive Devices:     Work status:  well director       ROS: Other than listed in HPI, PMHX below, and intake paperwork including a 30 point patient-recorded review of symptoms which was personally reviewed and inclusive of no history of unintentional weight loss, change in appetite, significant malaise, fevers, chills, or change in bowel/bladder, shortness of breath, or chest pain.    I have confirmed and edited as necessary Past Medical, Past Surgical, Family, Social History and ROS as obtained by others. These were also obtained on new patient forms.      PHYSICAL EXAM:   GENERAL APPEARANCE:  Well nourished, well developed, and no apparent distress.  NEURO PSYCH: Patient oriented to person, place, Mood pleasant. Benign affect.  MUSCULOSKELETAL and NEUROLOGICAL       VISUAL INSPECTION           LUMBAR: WNL  SPINE ROM:   LUMBAR ROM: Full with mild pain endrange flexion and extension      PALPATION:           SPINOUS PROCESS: Nontender lumbar           PARASPINALS: Tenderness to the left lumbosacral area.  No significant tenderness over the greater trochanter  FACET LOADING: Mildly positive left lower lumbar  MUSCLE BULK: Normal and symmetrical in the upper & lower extremities.  MUSCLE TONE: Normal  MOTOR: 5/5 in all muscle groups tested in bilateral lower extremities   SENSORY: Normal sensory exam to light  touch in the lower extremities  GAIT: Normal.  Able to go up and heels and toes with no sig. weakness.  No sig. balance deficit appreciated  STRAIGHT LEG TEST: Mildly positive on the left the pain does not go past the knee  PERIPHERAL JOINT ROM:   HIP ROM: Full bilaterally  HARISH/Thigh Thrust/Compression/Romario Finger:  Negative bilaterally   Hip Exam including thigh thrust and LOG ROLL: Negative bilaterally    DATA REVIEW:   The below imaging studies were personally reviewed and discussed with the patient.    Medical Decision Making:  The above note constitutes a Moderate to High level of medical decision making based on past data and imaging review, new and chronic symptoms with exacerbation, change in weakness or sensation, new imaging and diagnostic studies ordered, discussion of potential interventional or surgical treatment options, acute or chronic pain that may pose a threat to bodily function.    Past Medical History:   Diagnosis Date    Encounter for immunization safety counseling 09/23/2020    HPV vaccine counseling       Medication Documentation Review Audit       Reviewed by Kerrie Mcwilliams MD (Physician) on 02/10/25 at 0905      Medication Order Taking? Sig Documenting Provider Last Dose Status   clobetasol (Temovate) 0.05 % ointment [id number]  APPLY TO VULVA AFTER SHOWER TWICE A DAY X 6 WEEKS FOLLOWED BY DAILY UNTIL CAN WEAN TO 2-3 X/WEEK WITHOUT A FLARE. Christie Sesay MD [facility]  Active   estradiol (Estrace) 0.01 % (0.1 mg/gram) vaginal cream [id number] No Insert a pea-sized amount into vagina three times per week at bedtime Christie Sesay MD [facility] Taking Active   fluconazole (Diflucan) 150 mg tablet [id number]  Take one tablet every 72 hours Christie Sesay MD [facility]  Active   glycopyrronium tosylate 2.4 % towelette [id number] No Apply 1 Application topically once daily. Kerrie Mcwilliams MD Taking Active   L norgest/e.estradioL-e.estrad (Seasonique) 0.15 mg-30 mcg (84)/10 mcg (7) tablets,dose pack,3 month tablet  [id number]  TAKE 1 TABLET BY MOUTH EVERY DAY Christie Sesay MD [facility]  Active   levonorgestreL-ethinyl estrad (Seasonale) 0.15 mg-30 mcg (91) tablet [id number] No Take 1 tablet by mouth once daily. Historical Provider, MD Taking Active   prasterone, dhea, (Intrarosa) 6.5 mg insert [id number]  Insert 6.5 mg into the vagina once daily at bedtime. Christie Sesay MD [facility]  Active                    No Known Allergies    Social History     Socioeconomic History    Marital status: Single     Spouse name: Not on file    Number of children: Not on file    Years of education: Not on file    Highest education level: Not on file   Occupational History    Not on file   Tobacco Use    Smoking status: Never    Smokeless tobacco: Never   Substance and Sexual Activity    Alcohol use: Yes     Comment: occ/soc    Drug use: Never    Sexual activity: Not on file   Other Topics Concern    Not on file   Social History Narrative    Not on file     Social Drivers of Health     Financial Resource Strain: Not on file   Food Insecurity: Not on file   Transportation Needs: Not on file   Physical Activity: Not on file   Stress: Not on file   Social Connections: Not on file   Intimate Partner Violence: Not on file   Housing Stability: Not on file       No past surgical history on file.

## 2025-03-07 ENCOUNTER — HOSPITAL ENCOUNTER (OUTPATIENT)
Dept: RADIOLOGY | Facility: CLINIC | Age: 35
Discharge: HOME | End: 2025-03-07
Payer: COMMERCIAL

## 2025-03-07 ENCOUNTER — TELEPHONE (OUTPATIENT)
Dept: ORTHOPEDIC SURGERY | Facility: CLINIC | Age: 35
End: 2025-03-07
Payer: COMMERCIAL

## 2025-03-07 DIAGNOSIS — M54.16 LUMBAR RADICULITIS: ICD-10-CM

## 2025-03-07 DIAGNOSIS — M54.16 LUMBAR RADICULITIS: Primary | ICD-10-CM

## 2025-03-07 DIAGNOSIS — M47.816 LUMBAR SPONDYLOSIS: ICD-10-CM

## 2025-03-07 PROCEDURE — 72148 MRI LUMBAR SPINE W/O DYE: CPT

## 2025-03-07 NOTE — TELEPHONE ENCOUNTER
Patient called to review Lumbar MRI results and to discuss recommendations from Dr. Jones  Patient and mother understand the results of the MRI and Candi would like to move forward with Dr. Jones's recommendation for L3 and L4 TFESI.  Patient is scheduled for 3/28/25  Patient and mother appreciated the phone call

## 2025-03-25 ENCOUNTER — APPOINTMENT (OUTPATIENT)
Dept: ORTHOPEDIC SURGERY | Facility: CLINIC | Age: 35
End: 2025-03-25
Payer: COMMERCIAL

## 2025-03-28 ENCOUNTER — HOSPITAL ENCOUNTER (OUTPATIENT)
Dept: OPERATING ROOM | Facility: CLINIC | Age: 35
Discharge: HOME | End: 2025-03-28
Payer: COMMERCIAL

## 2025-03-28 VITALS
OXYGEN SATURATION: 100 % | HEART RATE: 80 BPM | DIASTOLIC BLOOD PRESSURE: 65 MMHG | RESPIRATION RATE: 16 BRPM | SYSTOLIC BLOOD PRESSURE: 131 MMHG | TEMPERATURE: 97.7 F

## 2025-03-28 DIAGNOSIS — M54.16 LUMBAR RADICULITIS: ICD-10-CM

## 2025-03-28 LAB — PREGNANCY TEST URINE, POC: NEGATIVE

## 2025-03-28 PROCEDURE — 2500000004 HC RX 250 GENERAL PHARMACY W/ HCPCS (ALT 636 FOR OP/ED): Performed by: PHYSICAL MEDICINE & REHABILITATION

## 2025-03-28 PROCEDURE — 3600000001 HC OR TIME - INITIAL BASE CHARGE - PROCEDURE LEVEL ONE

## 2025-03-28 PROCEDURE — 64483 NJX AA&/STRD TFRM EPI L/S 1: CPT | Performed by: PHYSICAL MEDICINE & REHABILITATION

## 2025-03-28 PROCEDURE — 64484 NJX AA&/STRD TFRM EPI L/S EA: CPT | Performed by: PHYSICAL MEDICINE & REHABILITATION

## 2025-03-28 PROCEDURE — 7100000010 HC PHASE TWO TIME - EACH INCREMENTAL 1 MINUTE

## 2025-03-28 PROCEDURE — 7100000009 HC PHASE TWO TIME - INITIAL BASE CHARGE

## 2025-03-28 PROCEDURE — 81025 URINE PREGNANCY TEST: CPT | Performed by: PHYSICAL MEDICINE & REHABILITATION

## 2025-03-28 PROCEDURE — 3600000006 HC OR TIME - EACH INCREMENTAL 1 MINUTE - PROCEDURE LEVEL ONE

## 2025-03-28 PROCEDURE — 2550000001 HC RX 255 CONTRASTS: Performed by: PHYSICAL MEDICINE & REHABILITATION

## 2025-03-28 RX ORDER — INDOMETHACIN 25 MG/1
CAPSULE ORAL AS NEEDED
Status: COMPLETED | OUTPATIENT
Start: 2025-03-28 | End: 2025-03-28

## 2025-03-28 RX ORDER — DEXAMETHASONE SODIUM PHOSPHATE 10 MG/ML
INJECTION INTRAMUSCULAR; INTRAVENOUS AS NEEDED
Status: COMPLETED | OUTPATIENT
Start: 2025-03-28 | End: 2025-03-28

## 2025-03-28 RX ORDER — MIDAZOLAM HYDROCHLORIDE 1 MG/ML
INJECTION, SOLUTION INTRAMUSCULAR; INTRAVENOUS AS NEEDED
Status: COMPLETED | OUTPATIENT
Start: 2025-03-28 | End: 2025-03-28

## 2025-03-28 RX ORDER — LIDOCAINE HYDROCHLORIDE 10 MG/ML
INJECTION, SOLUTION EPIDURAL; INFILTRATION; INTRACAUDAL; PERINEURAL AS NEEDED
Status: COMPLETED | OUTPATIENT
Start: 2025-03-28 | End: 2025-03-28

## 2025-03-28 RX ADMIN — LIDOCAINE HYDROCHLORIDE 10 ML: 10 INJECTION, SOLUTION EPIDURAL; INFILTRATION; INTRACAUDAL; PERINEURAL at 10:56

## 2025-03-28 RX ADMIN — SODIUM BICARBONATE 1 ML: 84 INJECTION, SOLUTION INTRAVENOUS at 10:57

## 2025-03-28 RX ADMIN — IOHEXOL 5 ML: 240 INJECTION, SOLUTION INTRATHECAL; INTRAVASCULAR; INTRAVENOUS; ORAL at 11:09

## 2025-03-28 RX ADMIN — DEXAMETHASONE SODIUM PHOSPHATE 15 MG: 10 INJECTION, SOLUTION INTRAMUSCULAR; INTRAVENOUS at 11:11

## 2025-03-28 RX ADMIN — MIDAZOLAM HYDROCHLORIDE 1 MG: 1 INJECTION, SOLUTION INTRAMUSCULAR; INTRAVENOUS at 10:54

## 2025-03-28 ASSESSMENT — PAIN SCALES - GENERAL
PAINLEVEL_OUTOF10: 5 - MODERATE PAIN
PAINLEVEL_OUTOF10: 0 - NO PAIN

## 2025-03-28 ASSESSMENT — COLUMBIA-SUICIDE SEVERITY RATING SCALE - C-SSRS
1. IN THE PAST MONTH, HAVE YOU WISHED YOU WERE DEAD OR WISHED YOU COULD GO TO SLEEP AND NOT WAKE UP?: NO
2. HAVE YOU ACTUALLY HAD ANY THOUGHTS OF KILLING YOURSELF?: NO
6. HAVE YOU EVER DONE ANYTHING, STARTED TO DO ANYTHING, OR PREPARED TO DO ANYTHING TO END YOUR LIFE?: NO

## 2025-03-28 ASSESSMENT — ENCOUNTER SYMPTOMS
FEVER: 0
SHORTNESS OF BREATH: 0
ARTHRALGIAS: 1
ABDOMINAL PAIN: 0
CHILLS: 0
APNEA: 0
BACK PAIN: 1
AGITATION: 0
NAUSEA: 0
CHEST TIGHTNESS: 0

## 2025-03-28 ASSESSMENT — PAIN - FUNCTIONAL ASSESSMENT
PAIN_FUNCTIONAL_ASSESSMENT: 0-10

## 2025-03-28 NOTE — DISCHARGE INSTRUCTIONS
"Post Procedure Instructions     1. You MUST have a  to take you home and be available to assist you at home if needed, unless specifically arranged before procedure.     2. Get up from your seated or lying position slowly and carefully. It is not unusual to have some \"numbness\" in your back or legs following a lumbar injection. After a cervical injection it is not abnormal to have arm or neck numbness. The symptoms (if they occur) may last a few hours, but will wear off. Have someone assist you as you walk if numbness in your legs occurs.     3. Complete sensory block is the intent of an injection to nerves. Occasionally in trying to achieve sensory blockade you also receive a motor blockade (weak arm or leg) 4. If you receive sedation, do not drive a motorized vehicle for 24 hours.     5. Avoid ALL alcoholic beverages the day of your procedure.     6. Discuss sleep and pain medications with your prescribing physician if you received sedation.     7. It is safe to resume medications once home.     8. Tomorrow you may resume regular activities to the level your pain will tolerate. The exception is no lifting over 20 pounds for 36 hours. You will likely experience a temporary exacerbation of pre-injection pain when anesthetic medication wears off.     9. Remove your band aid tomorrow.     10. Do not take a tub bath or submerge in water for 48 hours. You may shower.     11. Apply ice to your injection site if it is swollen or hurts after the injection. Only apply 20 minutes on, then off for 20 minutes. Take the ice off as you sleep.     12. Call scheduling office at [phone] to verify a follow-up appointment 4-6 weeks after your procedure / injection with your pain diary.     13. Call your physician immediately or go to the emergency room for any of the following symptoms:    Severe pain at your injection site (tightness or aching is normal)    Pain, redness, pus, or leaking fluid at the injection site    " "Prolonged or increasing numbness 14 hours after a block    A temperature over 101.5 degrees F and / or chills    Excessive bruising, bleeding, or swelling at the injection site    Loss of bowel or bladder control or \"saddle anesthesia\"    Inability to speak, facial droop, and / or limb paralysis     Note: if you have any questions please contact our office at [phone], if the office is closed please call the after hours phone number at [phone] and ask for the pain resident on call    MAKENNA Jones M.D.  Medicine and Rehabilitation, Orthopedic [facility]     To schedule FOLLOW-UP appointments, please use the call center at [phone].   To schedule future PROCEDURES or for questions for the doctor please call [phone].    "

## 2025-03-28 NOTE — H&P
History Of Present Illness  Candi Peres is a 34 y.o. female presenting with low back and left leg pain refractory to conservative treatment, achy and burning, worse with activity, better at rest, trying to avoid surgery, pain can be a 6-8/10.     Past Medical History  Past Medical History:   Diagnosis Date    Encounter for immunization safety counseling 09/23/2020    HPV vaccine counseling       Surgical History  No past surgical history on file.     Social History  She reports that she has never smoked. She has never used smokeless tobacco. She reports current alcohol use. She reports that she does not use drugs.    Family History  Family History   Problem Relation Name Age of Onset    Colon cancer Father  40    Other (anal cancer) Maternal Grandmother      Skin cancer Maternal Grandfather      Colon cancer Paternal Grandfather  65        Allergies  Patient has no known allergies.    Review of Systems   Constitutional:  Negative for chills and fever.   Respiratory:  Negative for apnea, chest tightness and shortness of breath.    Cardiovascular:  Negative for chest pain.   Gastrointestinal:  Negative for abdominal pain and nausea.   Musculoskeletal:  Positive for arthralgias and back pain.   Psychiatric/Behavioral:  Negative for agitation.    All other systems reviewed and are negative.       Physical Exam  Vitals reviewed.   Constitutional:       Appearance: Normal appearance.   HENT:      Head: Atraumatic.   Pulmonary:      Effort: Pulmonary effort is normal.      Breath sounds: Normal breath sounds.   Neurological:      Mental Status: She is alert and oriented to person, place, and time. Mental status is at baseline.   Psychiatric:         Mood and Affect: Mood normal.         Behavior: Behavior is cooperative.          Last Recorded Vitals  Blood pressure 134/70, pulse 79, temperature 36.3 °C (97.3 °F), temperature source Temporal, resp. rate 16, SpO2 99%.    Relevant Results         Assessment/Plan    Assessment & Plan  Lumbar radiculitis    Left L3 and L4 TFESI      Jaspreet Jones MD

## 2025-04-15 DIAGNOSIS — Z30.9 ENCOUNTER FOR CONTRACEPTIVE MANAGEMENT, UNSPECIFIED TYPE: ICD-10-CM

## 2025-04-15 RX ORDER — LEVONORGESTREL / ETHINYL ESTRADIOL AND ETHINYL ESTRADIOL 150-30(84)
1 KIT ORAL DAILY
Qty: 91 TABLET | Refills: 4 | Status: SHIPPED | OUTPATIENT
Start: 2025-04-15

## 2025-04-21 ENCOUNTER — APPOINTMENT (OUTPATIENT)
Dept: ORTHOPEDIC SURGERY | Facility: CLINIC | Age: 35
End: 2025-04-21
Payer: COMMERCIAL

## 2025-04-21 DIAGNOSIS — M47.816 LUMBAR SPONDYLOSIS: ICD-10-CM

## 2025-04-21 DIAGNOSIS — M54.16 LUMBAR RADICULITIS: Primary | ICD-10-CM

## 2025-04-21 PROCEDURE — 1036F TOBACCO NON-USER: CPT | Performed by: PHYSICAL MEDICINE & REHABILITATION

## 2025-04-21 PROCEDURE — 99213 OFFICE O/P EST LOW 20 MIN: CPT | Performed by: PHYSICAL MEDICINE & REHABILITATION

## 2025-04-21 SDOH — SOCIAL STABILITY: SOCIAL NETWORK: SOCIAL ACTIVITY:: 4

## 2025-04-21 NOTE — PROGRESS NOTES
Follow Up Note    Today's Date:   4/21/2025     Assessment: Very pleasant 34-year-old female with subacute left lower back and gluteal pain.  Intermittent radiating symptoms to the left lateral thigh.  Pain can be severe and impacts her daily activity.  -Suspect left lumbar discogenic pain with intermittent radiculitis.  Foraminal narrowing appreciated on lumbar MRI at the left L3 as well as L4  -Component of myofascial pain    - March 28, 2025: Left L3 and L4 transforaminal epidural steroid injection-80% improvement, sustained    - April 21, 2025 update: Overall very happy with her current progress following her injection as above.  She did have a flareup of pain on the right which is improved with conservative treatment.  At this time she will follow-up with us on an as-needed basis.  She is very happy with the care rendered thus far.     PLAN:  1)  Imaging/Diagnostic Studies: Lumbar MRI interpreted and reviewed showing foraminal narrowing at the left L3 most significantly but also at L4.  There is also mild narrowing on the right at these levels.  2)  Therapy/Rehabilitation: Has completed over 6 weeks including pelvic floor therapy as well as therapy dedicated to her lumbar spine.    3)  Pharmacological Management: Has not trialed Robaxin as needed-advised potential sedate of side effects.  No significant relief with NSAIDs  4)  Spine/Surgical Interventions: Very good relief with left L3 and L4 transforaminal epidural steroid injection  5)  Alternative Treatments: May consider alternative treatment options in the future including manipulation (chiropractor versus osteopathic) and/or acupuncture if patient does not obtain optimal relief with initial treatment plan.  6)  Consultations:  None at this time  7)  Follow -up: 4-6 weeks or PRN if symptoms worsen/do not improve.   8)  Future treatment considerations: Can consider repeat lumbar CRISTIAN.    Patient advised of the difference between hurt and harm and advised to  continue with all normal activities and exercises. Patient verbalized understanding of the above plan and was happy with the care provided.      The above clinical summary has been dictated with voice recognition software. It has not been proofread for grammatical errors, typographical mistakes, or other semantic inconsistencies.    Thank you for visiting our office today. It was our pleasure to take part in your healthcare.     Do not hesitate to call with any questions regarding your plan of care after leaving at [phone]    To clinicians, thank you very much for this kind referral. It is a privilege to partner with you in the care of your patients. My office would be delighted to assist you with any further consultations or with questions regarding the plan of care outlined. Do not hesitate to call the office or contact me directly.     Sincerely,    MAKENNA Jones MD  , Physical Medicine and Rehabilitation, Orthopedic Spine  [facility] [employer]  [facility]        Candi Peres  is a 34 y.o. female who was last seen March 20, 2025 for a left L3 and L4 transforaminal epidural steroid injection.  Since the last visit the pain is significantly improved on the left side.  Now having more pain on the right.  Has had some relief with heating pad when she uses.  Pain on the right was severe about a week ago - no obvious injury.  Pain is improving since. No worsening numbness or weakness.  Feels her numbness has actually improved on the left.  Her pain was a 10/10 a week ago but now improved, worse with side bending or rolling.     - Notes about 80% relief of her left sided symptoms since her injection.    - Continuing her HEP and pelvic floor therapy   - Methocarbamol: Has not tried yet     TREATMENTS  IN THE LAST SIX MONTHS     Active conservative therapy  in the last six months (see below)              1. Physical therapy: Yes                                                                                    2. Home exercise program after PT: Yes                                                    3. A physician supervised home exercise program (HEP):                 4. Chiropractic Care:                                                                      Passive conservative therapy  in the last six months (see below)              1. NSAIDS:                                                                                                           2. Prescription pain medication:                                                              3. Acupuncture:                                                                                             4. Tens unit:      Patient denies bowel/bladder incontinence, denies fever, denies unintentional weight loss, denies clumsiness of hands, feet, or dropping things.  Denies any constitutional or myelopathic symptomatology.     ROS: Other than listed in HPI, PMHX below, the patient denies any complaint of the following: severe cough, fainting, vision or language changes, shortness of breath, chest pain, nausea, vomiting or diarrhea, rash, dysuria, seizures, excessive sweating, or bleeding problems.    I have confirmed and edited as necessary Past Medical, Past Surgical, Family, Social History and ROS as obtained by others. No new sig. changes since last visit.       PHYSICAL EXAM:   GENERAL APPEARANCE:  Well nourished, well developed, and no apparent distress.  NEURO PSYCH: Patient oriented to person, place, Mood pleasant. Benign affect.  MUSCULOSKELETAL and NEUROLOGICAL       VISUAL INSPECTION           LUMBAR: WNL-no significant erythema or abnormality near the site of injection  MOTOR: Seems functionally full in the bilateral lower extremities  GAIT: Normal.  No sig. balance deficit     DATA REVIEW:   The below imaging studies were personally reviewed and discussed with the patient.    Medical Decision  Making:  The above note constitutes a Moderate to High level of medical decision making based on past data and imaging review, new and chronic symptoms with exacerbation, change in weakness or sensation, new imaging and diagnostic studies ordered, discussion of potential interventional or surgical treatment options, acute or chronic pain that may pose a threat to bodily function.    Medications Ordered Prior to Encounter[1]     Medical History[2]     Surgical History[3]     RX Allergies[4]          [1]   Current Outpatient Medications on File Prior to Visit   Medication Sig Dispense Refill    clobetasol (Temovate) 0.05 % ointment APPLY TO VULVA AFTER SHOWER TWICE A DAY X 6 WEEKS FOLLOWED BY DAILY UNTIL CAN WEAN TO 2-3 X/WEEK WITHOUT A FLARE. 60 g 3    estradiol (Estrace) 0.01 % (0.1 mg/gram) vaginal cream Insert a pea-sized amount into vagina three times per week at bedtime 42.5 g 3    fluconazole (Diflucan) 150 mg tablet Take one tablet every 72 hours 2 tablet 0    glycopyrronium tosylate 2.4 % towelette Apply 1 Application topically once daily. 30 each 11    L norgest/e.estradioL-e.estrad (Seasonique) 0.15 mg-30 mcg (84)/10 mcg (7) tablets,dose pack,3 month tablet Take 1 tablet by mouth once daily. 91 tablet 4    levonorgestreL-ethinyl estrad (Seasonale) 0.15 mg-30 mcg (91) tablet Take 1 tablet by mouth once daily.      methocarbamol (Robaxin) 500 mg tablet 1-2 tabs every 8 hrs as needed for muscle spasm 60 tablet 1    prasterone, dhea, (Intrarosa) 6.5 mg insert Insert 6.5 mg into the vagina once daily at bedtime. 30 each 5    [DISCONTINUED] L norgest/e.estradioL-e.estrad (Seasonique) 0.15 mg-30 mcg (84)/10 mcg (7) tablets,dose pack,3 month tablet TAKE 1 TABLET BY MOUTH EVERY DAY 91 tablet 3     No current facility-administered medications on file prior to visit.   [2]   Past Medical History:  Diagnosis Date    Encounter for immunization safety counseling 09/23/2020    HPV vaccine counseling   [3] History  reviewed. No pertinent surgical history.  [4] No Known Allergies

## 2025-04-28 ENCOUNTER — APPOINTMENT (OUTPATIENT)
Dept: ORTHOPEDIC SURGERY | Facility: CLINIC | Age: 35
End: 2025-04-28
Payer: COMMERCIAL

## 2025-05-03 DIAGNOSIS — N90.89 LABIAL ADHESION, ACQUIRED: ICD-10-CM

## 2025-05-05 RX ORDER — ESTRADIOL 0.1 MG/G
CREAM VAGINAL
Qty: 42.5 G | Refills: 3 | Status: SHIPPED | OUTPATIENT
Start: 2025-05-05

## 2025-06-03 DIAGNOSIS — L74.519 PRIMARY FOCAL HYPERHIDROSIS: ICD-10-CM

## 2025-06-04 ENCOUNTER — APPOINTMENT (OUTPATIENT)
Dept: ORTHOPEDIC SURGERY | Facility: CLINIC | Age: 35
End: 2025-06-04
Payer: COMMERCIAL

## 2025-06-12 RX ORDER — GLYCOPYRRONIUM 2.4 G/100G
CLOTH TOPICAL
Qty: 30 EACH | Refills: 11 | Status: SHIPPED | OUTPATIENT
Start: 2025-06-12

## 2025-07-30 NOTE — PROGRESS NOTES
FOLLOW-UP VISIT       HISTORY OF PRESENT ILLNESS:   Candi Peres is a 34 y.o. female presenting to me today for her annual well woman exam and follow-up of dyspareunia. At her previous visit on 12/19/25, the patient continued to experience dyspareunia since the middle of November after her menses. She was on Seasonique for contraception and had a menses every 3 months. Plan was to have the patient take Intrarosa PRN for her pain, take vaginal valium/baclofen suppositories PRN for pain, and have the patient skip the sugar pills with her Seasonique for 6 months at a time.      Today the patient notes that her symptoms have not improved with Intrarosa and the valium suppositories. She continued to work with PT for 1 month after her last visit and did not notice an improvement in her symptoms. Reports she then saw an orthopedic surgeon in 3/2025 who performed a spinal cortisone injection which provided some benefit for her. Reports she then experienced a flare on the right side 1 week after the injection but she considers this beneficial overall. She is currently working with a different PT for her pain to try to get back to her normal daily activities including working out.     Regarding the Seasonique, the patient is doing well with skipping the sugar pills for 6 months at a time.     Patient's last pap and HPV testing was on 6/20/23 which demonstrated no evidence of lesion, malignancy, and HPV.       PAST MEDICAL HISTORY:  Medical History[1]    PAST SURGICAL HISTORY:  Surgical History[2]     ALLERGIES:   Allergies[3]     MEDICATIONS:   Medication Documentation Review Audit       Reviewed by Christie Sesay MD MPH (Physician) on 07/31/25 at 0815      Medication Order Taking? Sig Documenting Provider Last Dose Status   clobetasol (Temovate) 0.05 % ointment [id number]  APPLY TO VULVA AFTER SHOWER TWICE A DAY X 6 WEEKS FOLLOWED BY DAILY UNTIL CAN WEAN TO 2-3 X/WEEK WITHOUT A FLARE. Christie Sesay MD MPH  Active   estradiol  (Estrace) 0.01 % (0.1 mg/gram) vaginal cream [id number]  INSERT A PEA-SIZED AMOUNT INTO VAGINA THREE TIMES PER WEEK AT BEDTIME Anusha Mitchell MD  Active   fluconazole (Diflucan) 150 mg tablet [id number]  Take one tablet every 72 hours Christie Sesay MD MPH  Active   glycopyrronium tosylate (Qbrexza) 2.4 % towelette [id number]  APPLY ONE (1) APPLICATION TOPICALLY ONCE DAILY. Kerrie Mcwilliams MD  Active   L norgest/e.estradioL-e.estrad (Seasonique) 0.15 mg-30 mcg (84)/10 mcg (7) tablets,dose pack,3 month tablet [id number]  Take 1 tablet by mouth once daily. Christie Sesay MD MPH  Active   levonorgestreL-ethinyl estrad (Seasonale) 0.15 mg-30 mcg (91) tablet [id number] Take 1 tablet by mouth once daily. Historical Provider, MD Taking Active   prasterone, dhea, (Intrarosa) 6.5 mg insert [id number]  Insert 6.5 mg into the vagina once daily at bedtime. Christie Sesay MD MPH  Active                     SOCIAL HISTORY:  Patient  reports that she has never smoked. She has never used smokeless tobacco. She reports current alcohol use. She reports that she does not use drugs.   Social History     Socioeconomic History    Marital status: Single     Spouse name: Not on file    Number of children: Not on file    Years of education: Not on file    Highest education level: Not on file   Occupational History    Not on file   Tobacco Use    Smoking status: Never    Smokeless tobacco: Never   Substance and Sexual Activity    Alcohol use: Yes     Comment: occ/soc    Drug use: Never    Sexual activity: Not on file   Other Topics Concern    Not on file   Social History Narrative    Not on file     Social Drivers of Health     Financial Resource Strain: Not on file   Food Insecurity: Not on file   Transportation Needs: Not on file   Physical Activity: Not on file   Stress: Not on file   Social Connections: Not on file   Intimate Partner Violence: Not on file   Housing Stability: Not on file       FAMILY HISTORY:  Family History[4]    REVIEW OF  SYSTEMS:  Constitutional: Negative for fever and chills. Denies anorexia, weight loss.  Eyes: Negative for visual disturbance.   Respiratory: Negative for shortness of breath.    Cardiovascular: Negative for chest pain.   Gastrointestinal: Negative for nausea and vomiting.   Genitourinary: See interval history above.  Skin: Negative for rash.   Neurological: Negative for dizziness and numbness.   Psychiatric/Behavioral: Negative for confusion and decreased concentration.     PHYSICAL EXAM:  There were no vitals taken for this visit.  :    Vaginal narrowing at the entrance of the vagina  Vulva appears healthy with retraction of the clitoral lentz  There is little no phimosis present, the entire gland is visible  There is mild hyperpigmentation bilaterally, however this is improved since last visit  Speculum exam done gently and cervix visualized, no friable tissue, cysts/lesions noted   Bimanual exam done and uterus is small and adnexa are nontender and without masses appreciated      Constitutional: Patient appears well-developed and well-nourished. No distress.   Head: Normocephalic and atraumatic.    Neck: Normal range of motion.    Cardiovascular: Normal rate.    Pulmonary/Chest: Effort normal. No respiratory distress.   Musculoskeletal: Normal range of motion.    Neurological: Alert and oriented to person, place, and time.  Psychiatric: Normal mood and affect. Behavior is normal. Thought content normal.       Assessment:      1. Women's annual routine gynecological examination        2. Dyspareunia, female        3. Lichen sclerosus            Candi Peres is a 34 y.o. female presenting for her annual well woman exam.      Plan:   Prescription for Intrarosa suppositories sent to the patient's pharmacy.   Prescription for vaginal valium/baclofen suppositories sent to the compounding pharmacy to use for flare.   Continue skipping the sugar pills with her Seasonique for 6 months at a time.  Prescription for  clobetasol ointment 0.05% sent to the patient's pharmacy.    Follow-up in 1 year and sooner if problems arise.       I spent 45 minutes of dedicated E&M time, including preparation and review of records, notes, and data, time spent with patient/family, and documentation.      Christie Sesay MD MPH    Scribe Attestation  By signing my name below, I, Melodie Carlos Scribe   attest that this documentation has been prepared under the direction and in the presence of Christie Sesay MD MPH.            [1]   Past Medical History:  Diagnosis Date    Encounter for immunization safety counseling 09/23/2020    HPV vaccine counseling   [2] No past surgical history on file.  [3] No Known Allergies  [4]   Family History  Problem Relation Name Age of Onset    Colon cancer Father  40    Other (anal cancer) Maternal Grandmother      Skin cancer Maternal Grandfather      Colon cancer Paternal Grandfather  65

## 2025-07-31 ENCOUNTER — APPOINTMENT (OUTPATIENT)
Dept: UROLOGY | Facility: CLINIC | Age: 35
End: 2025-07-31
Payer: COMMERCIAL

## 2025-07-31 DIAGNOSIS — N90.89 LABIAL ADHESION, ACQUIRED: ICD-10-CM

## 2025-07-31 DIAGNOSIS — Z30.9 ENCOUNTER FOR CONTRACEPTIVE MANAGEMENT, UNSPECIFIED TYPE: ICD-10-CM

## 2025-07-31 DIAGNOSIS — L90.0 LICHEN SCLEROSUS: ICD-10-CM

## 2025-07-31 DIAGNOSIS — Q52.5 FUSION OF LABIA: ICD-10-CM

## 2025-07-31 DIAGNOSIS — N94.10 DYSPAREUNIA, FEMALE: ICD-10-CM

## 2025-07-31 DIAGNOSIS — Z01.419 WOMEN'S ANNUAL ROUTINE GYNECOLOGICAL EXAMINATION: Primary | ICD-10-CM

## 2025-07-31 PROCEDURE — 99395 PREV VISIT EST AGE 18-39: CPT | Performed by: OBSTETRICS & GYNECOLOGY

## 2025-07-31 RX ORDER — CLOBETASOL PROPIONATE 0.5 MG/G
OINTMENT TOPICAL
Qty: 60 G | Refills: 3 | Status: SHIPPED | OUTPATIENT
Start: 2025-07-31

## 2025-07-31 RX ORDER — ESTRADIOL 0.1 MG/G
CREAM VAGINAL
Qty: 42.5 G | Refills: 3 | Status: SHIPPED | OUTPATIENT
Start: 2025-07-31

## 2025-07-31 RX ORDER — ESTRADIOL 0.1 MG/G
CREAM VAGINAL
Qty: 42.5 G | Refills: 3 | Status: CANCELLED | OUTPATIENT
Start: 2025-07-31

## 2025-07-31 RX ORDER — LEVONORGESTREL / ETHINYL ESTRADIOL AND ETHINYL ESTRADIOL 150-30(84)
1 KIT ORAL DAILY
Qty: 91 TABLET | Refills: 4 | Status: SHIPPED | OUTPATIENT
Start: 2025-07-31 | End: 2025-08-03 | Stop reason: SDUPTHER

## 2025-07-31 RX ORDER — PRASTERONE 6.5 MG/1
6.5 INSERT VAGINAL NIGHTLY
Qty: 90 EACH | Refills: 3 | Status: SHIPPED | OUTPATIENT
Start: 2025-07-31

## 2025-08-03 DIAGNOSIS — Z30.9 ENCOUNTER FOR CONTRACEPTIVE MANAGEMENT, UNSPECIFIED TYPE: ICD-10-CM

## 2025-08-03 RX ORDER — LEVONORGESTREL / ETHINYL ESTRADIOL AND ETHINYL ESTRADIOL 150-30(84)
1 KIT ORAL DAILY
Qty: 91 TABLET | Refills: 0 | Status: SHIPPED | OUTPATIENT
Start: 2025-08-03

## 2025-09-04 DIAGNOSIS — N94.10 DYSPAREUNIA, FEMALE: ICD-10-CM

## 2025-09-04 RX ORDER — ESTRADIOL 0.1 MG/G
CREAM VAGINAL
Qty: 42.5 G | Refills: 3 | Status: SHIPPED | OUTPATIENT
Start: 2025-09-04

## 2026-08-06 ENCOUNTER — APPOINTMENT (OUTPATIENT)
Dept: UROLOGY | Facility: CLINIC | Age: 36
End: 2026-08-06
Payer: COMMERCIAL